# Patient Record
Sex: FEMALE | Race: WHITE | NOT HISPANIC OR LATINO | Employment: UNEMPLOYED | URBAN - METROPOLITAN AREA
[De-identification: names, ages, dates, MRNs, and addresses within clinical notes are randomized per-mention and may not be internally consistent; named-entity substitution may affect disease eponyms.]

---

## 2023-04-09 ENCOUNTER — HOSPITAL ENCOUNTER (OUTPATIENT)
Dept: DATA CONVERSION | Facility: HOSPITAL | Age: 28
End: 2023-04-10
Attending: OBSTETRICS & GYNECOLOGY
Payer: COMMERCIAL

## 2023-04-09 LAB
ALANINE AMINOTRANSFERASE (SGPT) (U/L) IN SER/PLAS: 20 U/L (ref 7–45)
ALBUMIN (G/DL) IN SER/PLAS: 3.6 G/DL (ref 3.4–5)
ALKALINE PHOSPHATASE (U/L) IN SER/PLAS: 70 U/L (ref 33–110)
ANION GAP IN SER/PLAS: 15 MMOL/L (ref 10–20)
ASPARTATE AMINOTRANSFERASE (SGOT) (U/L) IN SER/PLAS: 19 U/L (ref 9–39)
BILIRUBIN TOTAL (MG/DL) IN SER/PLAS: 0.3 MG/DL (ref 0–1.2)
CALCIUM (MG/DL) IN SER/PLAS: 10.1 MG/DL (ref 8.6–10.3)
CARBON DIOXIDE, TOTAL (MMOL/L) IN SER/PLAS: 25 MMOL/L (ref 21–32)
CHLORIDE (MMOL/L) IN SER/PLAS: 104 MMOL/L (ref 98–107)
CREATININE (MG/DL) IN SER/PLAS: 0.76 MG/DL (ref 0.5–1.05)
ERYTHROCYTE DISTRIBUTION WIDTH (RATIO) BY AUTOMATED COUNT: 13.4 % (ref 11.5–14.5)
ERYTHROCYTE MEAN CORPUSCULAR HEMOGLOBIN CONCENTRATION (G/DL) BY AUTOMATED: 32.3 G/DL (ref 32–36)
ERYTHROCYTE MEAN CORPUSCULAR VOLUME (FL) BY AUTOMATED COUNT: 87 FL (ref 80–100)
ERYTHROCYTES (10*6/UL) IN BLOOD BY AUTOMATED COUNT: 4.55 X10E12/L (ref 4–5.2)
GFR FEMALE: >90 ML/MIN/1.73M2
GLUCOSE (MG/DL) IN SER/PLAS: 91 MG/DL (ref 74–99)
HEMATOCRIT (%) IN BLOOD BY AUTOMATED COUNT: 39.6 % (ref 36–46)
HEMOGLOBIN (G/DL) IN BLOOD: 12.8 G/DL (ref 12–16)
LACTATE DEHYDROGENASE (U/L) IN SER/PLAS BY LAC->PYR RXN: 180 U/L (ref 84–246)
LEUKOCYTES (10*3/UL) IN BLOOD BY AUTOMATED COUNT: 14.9 X10E9/L (ref 4.4–11.3)
PLATELETS (10*3/UL) IN BLOOD AUTOMATED COUNT: 282 X10E9/L (ref 150–450)
POTASSIUM (MMOL/L) IN SER/PLAS: 3.9 MMOL/L (ref 3.5–5.3)
PROTEIN TOTAL: 6.7 G/DL (ref 6.4–8.2)
SODIUM (MMOL/L) IN SER/PLAS: 140 MMOL/L (ref 136–145)
URATE (MG/DL) IN SER/PLAS: 8.6 MG/DL (ref 2.3–6.7)
UREA NITROGEN (MG/DL) IN SER/PLAS: 11 MG/DL (ref 6–23)

## 2023-09-06 VITALS — BODY MASS INDEX: 51.4 KG/M2 | HEIGHT: 62 IN | WEIGHT: 279.32 LBS

## 2023-09-14 NOTE — DISCHARGE SUMMARY
Send Summary:   Discharge Summary Providers:  Provider Role Provider Name   · Attending Farnaz Francis   · Referring Farnaz Francis   · Primary Denver Harrison       Note Recipients: Denver Harrison DO       Discharge:    Summary:   Admission Date: .09-Apr-2023 20:44:00   Discharge Date: 09-Apr-2023   Attending Physician at Discharge: Nam   Admission Reason: Severe BP elevations   Final Discharge Diagnoses: CHTN   Procedures: none   Condition at Discharge: Good   Disposition at Discharge: Home   Vital Signs:        T   P  R  BP   MAP  SpO2   Value  36.8  80  16  138/72   99  98%  Date/Time 4/9 22:34 4/9 23:56 4/9 22:34 4/9 23:56  4/9 23:56 4/9 23:55  Range  (36.8C - 36.8C )  (65 - 84 )  (16 - 16 )  (120 - 138 )/ (64 - 74 )  (86 - 99 )  (93% - 99% )    Date:            Weight/Scale Type:  Height:   03-Apr-2023 18:11  133.3  kg / standing  157.4  cm  Hospital Course:    Patient presented postpartum for severe range BP at home. Her BP was normal range over an hour of monitoring and her labs were normal except an elevated uric acid  of 8.1. She was discharged home with precautions and plan for follow up in the office in the next 2 days.    Immunizations:    Immunizations:  05-Apr-2023   Tdap: Immunizations, 05-Apr-2023      Discharge Information:    and Continuing Care:   Lab Results - Pending:    None  Radiology Results - Pending: None   [facility] Suicide Risk: negative   Discharge Instructions:    Activity:           Return to normal activity as tolerated    Nutrition/Diet:           Regular    Follow Up Appointments:    Follow-Up - OB Provider:           Physician/Dept/Service:   OB Provider   Dr. Browning          Call to Schedule in:   1 week          Location:   [place] [facility]          Phone Number:   [phone]    Discharge Medications: Ibuprofen/Tylenol     DNR Status:   ·  Code Status Code Status order at time of discharge: Full Code       Electronic Signatures:  Farnaz Francis ()  (Signed  09-Apr-2023 23:58)   Authored: Send Summary, Summary Content, Immunizations,  Ongoing Care, DNR Status, Note Completion      Last Updated: 09-Apr-2023 23:58 by Farnaz Francis (DO)

## 2024-02-21 ENCOUNTER — LAB (OUTPATIENT)
Dept: LAB | Facility: LAB | Age: 29
End: 2024-02-21
Payer: COMMERCIAL

## 2024-02-21 ENCOUNTER — OFFICE VISIT (OUTPATIENT)
Dept: OBSTETRICS AND GYNECOLOGY | Facility: CLINIC | Age: 29
End: 2024-02-21
Payer: COMMERCIAL

## 2024-02-21 VITALS
BODY MASS INDEX: 45.36 KG/M2 | SYSTOLIC BLOOD PRESSURE: 122 MMHG | WEIGHT: 256 LBS | DIASTOLIC BLOOD PRESSURE: 74 MMHG | HEIGHT: 63 IN

## 2024-02-21 DIAGNOSIS — O99.210 OBESITY IN PREGNANCY (HHS-HCC): ICD-10-CM

## 2024-02-21 DIAGNOSIS — O10.919 PRE-EXISTING HYPERTENSION DURING PREGNANCY, ANTEPARTUM, UNSPECIFIED PRE-EXISTING HYPERTENSION TYPE (HHS-HCC): ICD-10-CM

## 2024-02-21 DIAGNOSIS — Z32.00 ENCOUNTER FOR CONFIRMATION OF PREGNANCY TEST RESULT WITH PHYSICAL EXAMINATION: ICD-10-CM

## 2024-02-21 DIAGNOSIS — Z32.00 ENCOUNTER FOR CONFIRMATION OF PREGNANCY TEST RESULT WITH PHYSICAL EXAMINATION: Primary | ICD-10-CM

## 2024-02-21 DIAGNOSIS — Z32.01 PREGNANCY TEST POSITIVE (HHS-HCC): ICD-10-CM

## 2024-02-21 PROBLEM — D49.6 BRAIN TUMOR (MULTI): Status: ACTIVE | Noted: 2024-02-21

## 2024-02-21 LAB
ABO GROUP (TYPE) IN BLOOD: NORMAL
ALBUMIN SERPL BCP-MCNC: 3.8 G/DL (ref 3.4–5)
ALP SERPL-CCNC: 41 U/L (ref 33–110)
ALT SERPL W P-5'-P-CCNC: 11 U/L (ref 7–45)
ANION GAP SERPL CALC-SCNC: 14 MMOL/L (ref 10–20)
ANTIBODY SCREEN: NORMAL
AST SERPL W P-5'-P-CCNC: 13 U/L (ref 9–39)
BILIRUB SERPL-MCNC: 0.2 MG/DL (ref 0–1.2)
BUN SERPL-MCNC: 8 MG/DL (ref 6–23)
CALCIUM SERPL-MCNC: 8.7 MG/DL (ref 8.6–10.3)
CHLORIDE SERPL-SCNC: 105 MMOL/L (ref 98–107)
CO2 SERPL-SCNC: 21 MMOL/L (ref 21–32)
CREAT SERPL-MCNC: 0.46 MG/DL (ref 0.5–1.05)
CRL: 94 MM
EGFRCR SERPLBLD CKD-EPI 2021: >90 ML/MIN/1.73M*2
ERYTHROCYTE [DISTWIDTH] IN BLOOD BY AUTOMATED COUNT: 12.2 % (ref 11.5–14.5)
EST. AVERAGE GLUCOSE BLD GHB EST-MCNC: 97 MG/DL
GLUCOSE SERPL-MCNC: 101 MG/DL (ref 74–99)
HBA1C MFR BLD: 5 %
HBV SURFACE AG SERPL QL IA: NONREACTIVE
HCT VFR BLD AUTO: 40.2 % (ref 36–46)
HCV AB SER QL: NONREACTIVE
HGB BLD-MCNC: 13.3 G/DL (ref 12–16)
HIV 1+2 AB+HIV1 P24 AG SERPL QL IA: NONREACTIVE
LDH SERPL L TO P-CCNC: 144 U/L (ref 84–246)
MCH RBC QN AUTO: 29.1 PG (ref 26–34)
MCHC RBC AUTO-ENTMCNC: 33.1 G/DL (ref 32–36)
MCV RBC AUTO: 88 FL (ref 80–100)
NRBC BLD-RTO: 0 /100 WBCS (ref 0–0)
PLATELET # BLD AUTO: 277 X10*3/UL (ref 150–450)
POTASSIUM SERPL-SCNC: 4 MMOL/L (ref 3.5–5.3)
PREGNANCY TEST URINE, POC: POSITIVE
PROT SERPL-MCNC: 6.5 G/DL (ref 6.4–8.2)
RBC # BLD AUTO: 4.57 X10*6/UL (ref 4–5.2)
RH FACTOR (ANTIGEN D): NORMAL
SODIUM SERPL-SCNC: 136 MMOL/L (ref 136–145)
TREPONEMA PALLIDUM IGG+IGM AB [PRESENCE] IN SERUM OR PLASMA BY IMMUNOASSAY: NONREACTIVE
URATE SERPL-MCNC: 4 MG/DL (ref 2.3–6.7)
WBC # BLD AUTO: 10.5 X10*3/UL (ref 4.4–11.3)

## 2024-02-21 PROCEDURE — 76817 TRANSVAGINAL US OBSTETRIC: CPT | Performed by: NURSE PRACTITIONER

## 2024-02-21 PROCEDURE — 81025 URINE PREGNANCY TEST: CPT | Performed by: NURSE PRACTITIONER

## 2024-02-21 PROCEDURE — 86900 BLOOD TYPING SEROLOGIC ABO: CPT

## 2024-02-21 PROCEDURE — 80053 COMPREHEN METABOLIC PANEL: CPT

## 2024-02-21 PROCEDURE — 3078F DIAST BP <80 MM HG: CPT | Performed by: NURSE PRACTITIONER

## 2024-02-21 PROCEDURE — 86317 IMMUNOASSAY INFECTIOUS AGENT: CPT

## 2024-02-21 PROCEDURE — 86780 TREPONEMA PALLIDUM: CPT

## 2024-02-21 PROCEDURE — 84156 ASSAY OF PROTEIN URINE: CPT

## 2024-02-21 PROCEDURE — 85027 COMPLETE CBC AUTOMATED: CPT

## 2024-02-21 PROCEDURE — 83036 HEMOGLOBIN GLYCOSYLATED A1C: CPT

## 2024-02-21 PROCEDURE — 3074F SYST BP LT 130 MM HG: CPT | Performed by: NURSE PRACTITIONER

## 2024-02-21 PROCEDURE — 87389 HIV-1 AG W/HIV-1&-2 AB AG IA: CPT

## 2024-02-21 PROCEDURE — 86901 BLOOD TYPING SEROLOGIC RH(D): CPT

## 2024-02-21 PROCEDURE — 86803 HEPATITIS C AB TEST: CPT

## 2024-02-21 PROCEDURE — 84550 ASSAY OF BLOOD/URIC ACID: CPT

## 2024-02-21 PROCEDURE — 86850 RBC ANTIBODY SCREEN: CPT

## 2024-02-21 PROCEDURE — 36415 COLL VENOUS BLD VENIPUNCTURE: CPT

## 2024-02-21 PROCEDURE — 82570 ASSAY OF URINE CREATININE: CPT

## 2024-02-21 PROCEDURE — 87340 HEPATITIS B SURFACE AG IA: CPT

## 2024-02-21 PROCEDURE — 99214 OFFICE O/P EST MOD 30 MIN: CPT | Performed by: NURSE PRACTITIONER

## 2024-02-21 PROCEDURE — 83615 LACTATE (LD) (LDH) ENZYME: CPT

## 2024-02-21 PROCEDURE — 1036F TOBACCO NON-USER: CPT | Performed by: NURSE PRACTITIONER

## 2024-02-21 ASSESSMENT — ENCOUNTER SYMPTOMS
CARDIOVASCULAR NEGATIVE: 1
PSYCHIATRIC NEGATIVE: 1
EYES NEGATIVE: 1
NEUROLOGICAL NEGATIVE: 1
ALLERGIC/IMMUNOLOGIC NEGATIVE: 1
ENDOCRINE NEGATIVE: 1
GASTROINTESTINAL NEGATIVE: 1
HEMATOLOGIC/LYMPHATIC NEGATIVE: 1
RESPIRATORY NEGATIVE: 1
FATIGUE: 1
MUSCULOSKELETAL NEGATIVE: 1

## 2024-02-21 NOTE — PROGRESS NOTES
"Chief Complaint    PREGNANCY CONFIRMATION        HPI    LMP 23    NATACHA 8/10/24  15.4 WEEKS  Last edited by Nikole Lira MA on 2024  9:51 AM.         Monse White is a 28 y.o.  female who presents for a pregnancy confirmation.     Patient's last menstrual period was 2023 (exact date).  She is 15.4 weeks gestation with NATACHA 08/10/2024.  Patient's menstrual cycles are regular, every 30 days, lasting 5 days.  She had +home pregnancy test in December.   Denies vaginal bleeding since her LMP.  Reports symptoms of fatigue.   She is a non-smoker.    Her pregnancy is complicated by:  Obesity: BMI 45.35  H/O CHTN    PMH  H/O Brain tumor    /74   Ht 1.6 m (5' 3\")   Wt 116 kg (256 lb)   LMP 2023 (Exact Date)   BMI 45.35 kg/m²      Review of Systems   Constitutional:  Positive for fatigue.   HENT: Negative.     Eyes: Negative.    Respiratory: Negative.     Cardiovascular: Negative.    Gastrointestinal: Negative.    Endocrine: Negative.    Genitourinary: Negative.    Musculoskeletal: Negative.    Skin: Negative.    Allergic/Immunologic: Negative.    Neurological: Negative.    Hematological: Negative.    Psychiatric/Behavioral: Negative.     All other systems reviewed and are negative.       Physical Exam  Constitutional:       Appearance: Normal appearance.   HENT:      Head: Normocephalic.      Nose: Nose normal.   Pulmonary:      Effort: Pulmonary effort is normal.   Musculoskeletal:         General: Normal range of motion.      Cervical back: Normal range of motion and neck supple.   Skin:     General: Skin is warm and dry.   Neurological:      Mental Status: She is alert.   Psychiatric:         Mood and Affect: Mood normal.         Behavior: Behavior normal.        BSUS: Limited d/t habitus. Viable IUP with +FM and +FCA measuring ~15.1 weeks which is c/w LMP    Assessment/Plan   Diagnoses and all orders for this visit:  Encounter for confirmation of pregnancy test result with " physical examination  -Seen today for confirmation of pregnancy.  -     US OB transvaginal  -Pregnancy confirmed with +urine pregnancy test and trans-abdominal US performed today.  -Viable pregnancy in second trimester w/ +fetal heartbeat and US confirming gestational age that is c/w LMP.  -NATACHA 08/10/2024.  -Orders for prenatal lab work today.  -     CBC Anemia Panel With Reflex,Pregnancy; Future  -     Hepatitis B Surface Antigen; Future  -     Hepatitis C Antibody; Future  -     HIV 1/2 Antigen/Antibody Screen with Reflex to Confirmation; Future  -     Rubella Antibody, IgG; Future  -     Syphilis Screen with Reflex; Future  -     Type And Screen; Future  -Genetic screening options reviewed, patient declines at this time.   -Follow up for new OB 2 weeks.   Pregnancy test positive  -     POCT pregnancy, urine manually resulted  Obesity in pregnancy  -Early diabetes testing indicated:  -     Hemoglobin A1C; Future  -     US MAC OB imaging order; Future  Pre-existing hypertension during pregnancy, antepartum, unspecified pre-existing hypertension type  -Baseline PEC labs, UPCr:  -     Comprehensive Metabolic Panel; Future  -     Lactate Dehydrogenase; Future  -     Protein, Urine Random  -     Uric Acid; Future  -     US MAC OB imaging order; Future   -Advised to start aspirin prophylaxis 162 mg daily.

## 2024-02-22 LAB
CREAT UR-MCNC: 183.5 MG/DL (ref 20–320)
PROT UR-ACNC: 14 MG/DL (ref 5–24)
PROT/CREAT UR: 0.08 MG/MG CREAT (ref 0–0.17)
REFLEX ADDED, ANEMIA PANEL: NORMAL
RUBV IGG SERPL IA-ACNC: 1.5 IA
RUBV IGG SERPL QL IA: POSITIVE

## 2024-03-05 ENCOUNTER — ANCILLARY ORDERS (OUTPATIENT)
Dept: OBSTETRICS AND GYNECOLOGY | Facility: CLINIC | Age: 29
End: 2024-03-05

## 2024-03-05 ENCOUNTER — INITIAL PRENATAL (OUTPATIENT)
Dept: OBSTETRICS AND GYNECOLOGY | Facility: CLINIC | Age: 29
End: 2024-03-05
Payer: COMMERCIAL

## 2024-03-05 VITALS — DIASTOLIC BLOOD PRESSURE: 80 MMHG | BODY MASS INDEX: 46.23 KG/M2 | SYSTOLIC BLOOD PRESSURE: 116 MMHG | WEIGHT: 261 LBS

## 2024-03-05 DIAGNOSIS — O09.32 LATE PRENATAL CARE AFFECTING PREGNANCY IN SECOND TRIMESTER (HHS-HCC): ICD-10-CM

## 2024-03-05 DIAGNOSIS — O99.210 OBESITY IN PREGNANCY (HHS-HCC): ICD-10-CM

## 2024-03-05 DIAGNOSIS — O10.919 PRE-EXISTING HYPERTENSION DURING PREGNANCY, ANTEPARTUM, UNSPECIFIED PRE-EXISTING HYPERTENSION TYPE (HHS-HCC): ICD-10-CM

## 2024-03-05 DIAGNOSIS — O99.210 OBESITY IN PREGNANCY (HHS-HCC): Primary | ICD-10-CM

## 2024-03-05 DIAGNOSIS — Z3A.17 17 WEEKS GESTATION OF PREGNANCY (HHS-HCC): ICD-10-CM

## 2024-03-05 DIAGNOSIS — Z34.02 ENCOUNTER FOR SUPERVISION OF NORMAL FIRST PREGNANCY IN SECOND TRIMESTER (HHS-HCC): Primary | ICD-10-CM

## 2024-03-05 LAB
POC APPEARANCE, URINE: CLEAR
POC BILIRUBIN, URINE: NEGATIVE
POC BLOOD, URINE: NEGATIVE
POC COLOR, URINE: YELLOW
POC GLUCOSE, URINE: NEGATIVE MG/DL
POC KETONES, URINE: NEGATIVE MG/DL
POC LEUKOCYTES, URINE: NEGATIVE
POC NITRITE,URINE: NEGATIVE
POC PH, URINE: 5 PH
POC PROTEIN, URINE: NEGATIVE MG/DL
POC SPECIFIC GRAVITY, URINE: 1.01
POC UROBILINOGEN, URINE: 0.2 EU/DL

## 2024-03-05 PROCEDURE — 90471 IMMUNIZATION ADMIN: CPT | Performed by: NURSE PRACTITIONER

## 2024-03-05 PROCEDURE — 87086 URINE CULTURE/COLONY COUNT: CPT

## 2024-03-05 PROCEDURE — 90686 IIV4 VACC NO PRSV 0.5 ML IM: CPT | Performed by: NURSE PRACTITIONER

## 2024-03-05 PROCEDURE — 87800 DETECT AGNT MULT DNA DIREC: CPT

## 2024-03-05 PROCEDURE — 0500F INITIAL PRENATAL CARE VISIT: CPT | Performed by: NURSE PRACTITIONER

## 2024-03-05 NOTE — PROGRESS NOTES
Monse White 28 y.o.  at 17w3d with a working estimated date of delivery of Estimated Date of Delivery: 8/10/24 who presents for initial prenatal visit.   She is feeling well overall.   Counseled and advised flu vaccine, given today.    Her pregnancy is complicated by:  Obesity: Pregravid BMI 45. Normal HgbA1C. Plan serial growth ultrasounds and weekly NSTs starting at 34 weeks.  CHTN: Normal baseline PEC labs, UPCr. Taking ASA prophylaxis as advised.    Pap 2021 Negative   NG/CT, Ucx collected today  Prenatal labs normal  Genetic screening options reviewed, patient declines  Anatomy ultrasound ordered.     Objective   Fetal Heart Rate: +US  BP: 116/80  Urine Dipstick  Urine Protein: NEGATIVE  Urine Leukocytes: NEGATIVE  Urine Ketone: NEGATIVE  Urine Glucose: NEGATIVE  Weight  Weight: 118 kg (261 lb)     BSUS: Limited d/t habitus. +FCA and +FM, grossly normal fluid      Assessment/Plan   Diagnoses and all orders for this visit:  Encounter for supervision of normal first pregnancy in second trimester  -     C. Trachomatis / N. Gonorrhoeae, Amplified Detection  -     Urine culture  17 weeks gestation of pregnancy  -     POCT UA (nonautomated) manually resulted  Obesity in pregnancy  Pre-existing hypertension during pregnancy, antepartum, unspecified pre-existing hypertension type  Late prenatal care affecting pregnancy in second trimester  -     US MAC OB imaging order; Future   Patient oriented to practice including shared OB call and how to reach physician on call  NG/CT and urine cultures collected  Declines genetics  Anatomy US ordered  Flu vaccine given today  Precautions given  Follow up routine prenatal visit in 4 weeks or sooner if needed.

## 2024-03-06 LAB
BACTERIA UR CULT: NORMAL
C TRACH RRNA SPEC QL NAA+PROBE: NEGATIVE
N GONORRHOEA DNA SPEC QL PROBE+SIG AMP: NEGATIVE

## 2024-03-26 ENCOUNTER — HOSPITAL ENCOUNTER (OUTPATIENT)
Dept: RADIOLOGY | Facility: CLINIC | Age: 29
Discharge: HOME | End: 2024-03-26
Payer: COMMERCIAL

## 2024-03-26 DIAGNOSIS — O09.32 LATE PRENATAL CARE AFFECTING PREGNANCY IN SECOND TRIMESTER (HHS-HCC): ICD-10-CM

## 2024-03-26 DIAGNOSIS — O10.919 PRE-EXISTING HYPERTENSION DURING PREGNANCY, ANTEPARTUM, UNSPECIFIED PRE-EXISTING HYPERTENSION TYPE (HHS-HCC): ICD-10-CM

## 2024-03-26 DIAGNOSIS — O99.210 OBESITY IN PREGNANCY (HHS-HCC): ICD-10-CM

## 2024-03-26 PROCEDURE — 76811 OB US DETAILED SNGL FETUS: CPT | Performed by: OBSTETRICS & GYNECOLOGY

## 2024-03-26 PROCEDURE — 76811 OB US DETAILED SNGL FETUS: CPT

## 2024-03-29 NOTE — PROGRESS NOTES
Subjective   Patient ID [id number]   Monse White is a 28 y.o.  at 21w3d     Her pregnancy is complicated by:  Obesity: Pregravid BMI 45. Normal HgbA1C. Plan serial growth ultrasounds and weekly NSTs starting at 34 weeks.  CHTN: Normal baseline PEC labs, UPCr. Taking ASA prophylaxis as advised.    Objective   Physical Exam               Lab Results   Component Value Date    HGB 13.3 2024    HCT 40.2 2024       Assessment/Plan     Follow up in 4 weeks for a routine prenatal visit.

## 2024-04-02 ENCOUNTER — APPOINTMENT (OUTPATIENT)
Dept: OBSTETRICS AND GYNECOLOGY | Facility: CLINIC | Age: 29
End: 2024-04-02
Payer: COMMERCIAL

## 2024-04-18 ENCOUNTER — ROUTINE PRENATAL (OUTPATIENT)
Dept: OBSTETRICS AND GYNECOLOGY | Facility: CLINIC | Age: 29
End: 2024-04-18
Payer: COMMERCIAL

## 2024-04-18 VITALS — WEIGHT: 271 LBS | BODY MASS INDEX: 48.01 KG/M2 | SYSTOLIC BLOOD PRESSURE: 120 MMHG | DIASTOLIC BLOOD PRESSURE: 78 MMHG

## 2024-04-18 DIAGNOSIS — Z34.82 PRENATAL CARE, SUBSEQUENT PREGNANCY, SECOND TRIMESTER (HHS-HCC): Primary | ICD-10-CM

## 2024-04-18 DIAGNOSIS — O99.210 OBESITY IN PREGNANCY (HHS-HCC): ICD-10-CM

## 2024-04-18 DIAGNOSIS — Z3A.23 23 WEEKS GESTATION OF PREGNANCY (HHS-HCC): ICD-10-CM

## 2024-04-18 DIAGNOSIS — O10.019 PRE-EXISTING ESSENTIAL HYPERTENSION DURING PREGNANCY, ANTEPARTUM (HHS-HCC): ICD-10-CM

## 2024-04-18 LAB
POC GLUCOSE, URINE: NEGATIVE MG/DL
POC KETONES, URINE: ABNORMAL MG/DL
POC PROTEIN, URINE: ABNORMAL MG/DL

## 2024-04-18 PROCEDURE — 0501F PRENATAL FLOW SHEET: CPT | Performed by: OBSTETRICS & GYNECOLOGY

## 2024-04-18 NOTE — PROGRESS NOTES
Prenatal Visit    Patient presents for routine prenatal visit.   Feeling well.  Baby is moving. No abdominal pain. No bleeding. No leaking fluid.    Objective   Physical Exam:   Weight: 123 kg (271 lb)  Expected Total Weight Gain: 5 kg (11 lb)-9 kg (19 lb)   Pregravid BMI: 45.36  BP: 120/78  Fetal Heart Rate: 155 Fundal Height (cm): 27 cm             Assessment/Plan   1. Prenatal care, subsequent pregnancy, second trimester ([facility])  2. 23 weeks gestation of pregnancy ([facility])  - POCT UA Automated manually resulted  - CBC Anemia Panel With Reflex,Pregnancy; Future  - Glucose, 1 Hour Screen, Pregnancy; Future  3. Obesity in pregnancy ([facility])  4. Pre-existing essential hypertension during pregnancy, antepartum ([facility])    Doing well today.  Orders placed for glucose screening, second trimester CBC.  Blood pressures well controlled.  Continue prenatal vitamins  Precautions given. Advised her to call for any concerns.  Follow up in 4 weeks.

## 2024-05-21 ENCOUNTER — APPOINTMENT (OUTPATIENT)
Dept: OBSTETRICS AND GYNECOLOGY | Facility: CLINIC | Age: 29
End: 2024-05-21
Payer: COMMERCIAL

## 2024-05-21 DIAGNOSIS — Z34.80 SUPERVISION OF OTHER NORMAL PREGNANCY, ANTEPARTUM (HHS-HCC): ICD-10-CM

## 2024-05-21 NOTE — PROGRESS NOTES
Monse White is a 28 y.o.  at 28w3d with a working estimated date of delivery of 8/10/24 by last menstrual period presents today for her routine prenatal visit.  +FM, no LOF, VB  ***      Scribe Attestation  By signing my name below, IColeen, Scribe   attest that this documentation has been prepared under the direction and in the presence of Jose Enriquez MD.

## 2024-05-23 ENCOUNTER — ROUTINE PRENATAL (OUTPATIENT)
Dept: OBSTETRICS AND GYNECOLOGY | Facility: CLINIC | Age: 29
End: 2024-05-23
Payer: COMMERCIAL

## 2024-05-23 ENCOUNTER — LAB (OUTPATIENT)
Dept: LAB | Facility: LAB | Age: 29
End: 2024-05-23
Payer: COMMERCIAL

## 2024-05-23 VITALS — BODY MASS INDEX: 49.42 KG/M2 | SYSTOLIC BLOOD PRESSURE: 122 MMHG | DIASTOLIC BLOOD PRESSURE: 70 MMHG | WEIGHT: 279 LBS

## 2024-05-23 DIAGNOSIS — Z34.80 SUPERVISION OF OTHER NORMAL PREGNANCY, ANTEPARTUM (HHS-HCC): Primary | ICD-10-CM

## 2024-05-23 DIAGNOSIS — O10.019 PRE-EXISTING ESSENTIAL HYPERTENSION DURING PREGNANCY, ANTEPARTUM (HHS-HCC): ICD-10-CM

## 2024-05-23 DIAGNOSIS — O99.210 OBESITY IN PREGNANCY (HHS-HCC): ICD-10-CM

## 2024-05-23 DIAGNOSIS — Z3A.23 23 WEEKS GESTATION OF PREGNANCY (HHS-HCC): ICD-10-CM

## 2024-05-23 LAB
ERYTHROCYTE [DISTWIDTH] IN BLOOD BY AUTOMATED COUNT: 13.4 % (ref 11.5–14.5)
GLUCOSE 1H P 50 G GLC PO SERPL-MCNC: 110 MG/DL
HCT VFR BLD AUTO: 36.7 % (ref 36–46)
HGB BLD-MCNC: 11.5 G/DL (ref 12–16)
MCH RBC QN AUTO: 28.6 PG (ref 26–34)
MCHC RBC AUTO-ENTMCNC: 31.3 G/DL (ref 32–36)
MCV RBC AUTO: 91 FL (ref 80–100)
NRBC BLD-RTO: 0 /100 WBCS (ref 0–0)
PLATELET # BLD AUTO: 236 X10*3/UL (ref 150–450)
POC APPEARANCE, URINE: CLEAR
POC BILIRUBIN, URINE: NEGATIVE
POC BLOOD, URINE: NEGATIVE
POC COLOR, URINE: YELLOW
POC GLUCOSE, URINE: NEGATIVE MG/DL
POC KETONES, URINE: NEGATIVE MG/DL
POC LEUKOCYTES, URINE: NEGATIVE
POC NITRITE,URINE: NEGATIVE
POC PH, URINE: 7 PH
POC PROTEIN, URINE: NEGATIVE MG/DL
POC SPECIFIC GRAVITY, URINE: 1.01
POC UROBILINOGEN, URINE: 0.2 EU/DL
RBC # BLD AUTO: 4.02 X10*6/UL (ref 4–5.2)
WBC # BLD AUTO: 10.2 X10*3/UL (ref 4.4–11.3)

## 2024-05-23 PROCEDURE — 36415 COLL VENOUS BLD VENIPUNCTURE: CPT

## 2024-05-23 PROCEDURE — 85027 COMPLETE CBC AUTOMATED: CPT

## 2024-05-23 PROCEDURE — 0501F PRENATAL FLOW SHEET: CPT | Performed by: OBSTETRICS & GYNECOLOGY

## 2024-05-23 PROCEDURE — 82947 ASSAY GLUCOSE BLOOD QUANT: CPT

## 2024-05-23 NOTE — PROGRESS NOTES
Prenatal Visit    Patient presents for routine prenatal visit.   Feeling ok.  Thinks she might have a hernia, has a bump in her belly button. Just sees it when she is sitting, goes away when lying down. No pain.  Baby is moving. No abdominal pain. No bleeding. No leaking fluid.    Objective   Physical Exam:   Weight: 127 kg (279 lb)  Expected Total Weight Gain: 5 kg (11 lb)-9 kg (19 lb)   Pregravid BMI: 45.36  BP: 122/70  Fetal Heart Rate: 145 Fundal Height (cm): 34 cm             Assessment/Plan   1. Supervision of other normal pregnancy, antepartum ([facility])  - POCT UA (nonautomated) manually resulted  2. Pre-existing essential hypertension during pregnancy, antepartum ([facility])  3. Obesity in pregnancy ([facility])    Doing well today.  Blood pressure continues to be well controlled.  She is doing her glucose screening today.  Has a growth ultrasound in 2 weeks.  Precautions given. Advised her to call for any concerns.  Follow up in 2-3 weeks.

## 2024-05-24 LAB — REFLEX ADDED, ANEMIA PANEL: NORMAL

## 2024-06-04 ENCOUNTER — HOSPITAL ENCOUNTER (OUTPATIENT)
Dept: RADIOLOGY | Facility: CLINIC | Age: 29
Discharge: HOME | End: 2024-06-04
Payer: COMMERCIAL

## 2024-06-04 DIAGNOSIS — O99.210 OBESITY IN PREGNANCY (HHS-HCC): ICD-10-CM

## 2024-06-04 DIAGNOSIS — O10.919 PRE-EXISTING HYPERTENSION DURING PREGNANCY, ANTEPARTUM, UNSPECIFIED PRE-EXISTING HYPERTENSION TYPE (HHS-HCC): ICD-10-CM

## 2024-06-04 DIAGNOSIS — O09.32 LATE PRENATAL CARE AFFECTING PREGNANCY IN SECOND TRIMESTER (HHS-HCC): ICD-10-CM

## 2024-06-04 PROCEDURE — 76816 OB US FOLLOW-UP PER FETUS: CPT | Performed by: STUDENT IN AN ORGANIZED HEALTH CARE EDUCATION/TRAINING PROGRAM

## 2024-06-04 PROCEDURE — 76816 OB US FOLLOW-UP PER FETUS: CPT

## 2024-06-05 ENCOUNTER — DOCUMENTATION (OUTPATIENT)
Dept: OBSTETRICS AND GYNECOLOGY | Facility: CLINIC | Age: 29
End: 2024-06-05
Payer: COMMERCIAL

## 2024-06-05 NOTE — PROGRESS NOTES
Chief Complaint    Results        HPI       Results     Additional comments: Growth US reviewed          Last edited by Kelsea Zacarias, LILLIANA-CNP on 6/5/2024  9:28 AM.         Normal interval growth

## 2024-06-07 ENCOUNTER — ROUTINE PRENATAL (OUTPATIENT)
Dept: OBSTETRICS AND GYNECOLOGY | Facility: CLINIC | Age: 29
End: 2024-06-07
Payer: COMMERCIAL

## 2024-06-07 VITALS — BODY MASS INDEX: 49.6 KG/M2 | WEIGHT: 280 LBS | DIASTOLIC BLOOD PRESSURE: 66 MMHG | SYSTOLIC BLOOD PRESSURE: 108 MMHG

## 2024-06-07 DIAGNOSIS — O99.210 OBESITY IN PREGNANCY (HHS-HCC): ICD-10-CM

## 2024-06-07 DIAGNOSIS — Z23 NEED FOR TDAP VACCINATION: ICD-10-CM

## 2024-06-07 DIAGNOSIS — Z34.80 SUPERVISION OF OTHER NORMAL PREGNANCY, ANTEPARTUM (HHS-HCC): Primary | ICD-10-CM

## 2024-06-07 LAB
POC APPEARANCE, URINE: CLEAR
POC BILIRUBIN, URINE: NEGATIVE
POC BLOOD, URINE: NEGATIVE
POC COLOR, URINE: YELLOW
POC GLUCOSE, URINE: NEGATIVE MG/DL
POC KETONES, URINE: NEGATIVE MG/DL
POC LEUKOCYTES, URINE: NEGATIVE
POC NITRITE,URINE: NEGATIVE
POC PH, URINE: 5 PH
POC PROTEIN, URINE: NEGATIVE MG/DL
POC SPECIFIC GRAVITY, URINE: 1.02
POC UROBILINOGEN, URINE: 0.2 EU/DL

## 2024-06-07 PROCEDURE — 90471 IMMUNIZATION ADMIN: CPT | Performed by: OBSTETRICS & GYNECOLOGY

## 2024-06-07 PROCEDURE — 0501F PRENATAL FLOW SHEET: CPT | Performed by: OBSTETRICS & GYNECOLOGY

## 2024-06-07 PROCEDURE — 90715 TDAP VACCINE 7 YRS/> IM: CPT | Performed by: OBSTETRICS & GYNECOLOGY

## 2024-06-07 NOTE — PROGRESS NOTES
Subjective   Monse White is a 28 y.o.  at 30w6d, presents for a routine prenatal visit.   Feels well.  Recent growth ultrasound normal interval growth.  We discussed her BMI nearing 50, delivery CBC if 50.    Objective     /66   Wt 127 kg (280 lb)   LMP 2023 (Exact Date)   BMI 49.60 kg/m²     Lab Results   Component Value Date    HGB 11.5 (L) 2024    HCT 36.7 2024 normal one hour glucose 110  2024 ultrasound normal interval fetal growth EFW 39%ile. Known right ovarian cyst stable.     Plan  1. Supervision of other normal pregnancy, antepartum ([facility])  - POCT UA (nonautomated) manually resulted    2. Need for Tdap vaccination  - Tdap vaccine, age 7 years and older ([facility])    3. Obesity in pregnancy ([facility])  Advised regarding delivery [facility] if BMI 50  Start NST 34 weeks, weekly.  Next growth ultrasound 36 weeks scheduled.    RTO 2 weeks.    Tabatha Cardozo MD

## 2024-06-10 ENCOUNTER — INITIAL PRENATAL (OUTPATIENT)
Dept: OBSTETRICS AND GYNECOLOGY | Facility: CLINIC | Age: 29
End: 2024-06-10
Payer: COMMERCIAL

## 2024-06-10 VITALS — DIASTOLIC BLOOD PRESSURE: 78 MMHG | SYSTOLIC BLOOD PRESSURE: 120 MMHG | WEIGHT: 279 LBS | BODY MASS INDEX: 49.42 KG/M2

## 2024-06-10 DIAGNOSIS — O26.893: ICD-10-CM

## 2024-06-10 DIAGNOSIS — O36.8130 DECREASED FETAL MOVEMENTS IN THIRD TRIMESTER, SINGLE OR UNSPECIFIED FETUS (HHS-HCC): ICD-10-CM

## 2024-06-10 DIAGNOSIS — O23.43: ICD-10-CM

## 2024-06-10 DIAGNOSIS — R10.2: ICD-10-CM

## 2024-06-10 DIAGNOSIS — Z34.80 SUPERVISION OF OTHER NORMAL PREGNANCY, ANTEPARTUM (HHS-HCC): Primary | ICD-10-CM

## 2024-06-10 LAB
POC APPEARANCE, URINE: CLEAR
POC BILIRUBIN, URINE: NEGATIVE
POC BLOOD, URINE: ABNORMAL
POC COLOR, URINE: ABNORMAL
POC GLUCOSE, URINE: NEGATIVE MG/DL
POC KETONES, URINE: NEGATIVE MG/DL
POC LEUKOCYTES, URINE: NEGATIVE
POC NITRITE,URINE: NEGATIVE
POC PH, URINE: 5 PH
POC PROTEIN, URINE: ABNORMAL MG/DL
POC SPECIFIC GRAVITY, URINE: 1.02
POC UROBILINOGEN, URINE: 0.2 EU/DL

## 2024-06-10 PROCEDURE — 59025 FETAL NON-STRESS TEST: CPT | Performed by: OBSTETRICS & GYNECOLOGY

## 2024-06-10 PROCEDURE — 0501F PRENATAL FLOW SHEET: CPT | Performed by: OBSTETRICS & GYNECOLOGY

## 2024-06-10 PROCEDURE — 87086 URINE CULTURE/COLONY COUNT: CPT

## 2024-06-10 RX ORDER — NITROFURANTOIN 25; 75 MG/1; MG/1
100 CAPSULE ORAL 2 TIMES DAILY
Qty: 14 CAPSULE | Refills: 0 | Status: SHIPPED | OUTPATIENT
Start: 2024-06-10 | End: 2024-06-17

## 2024-06-10 NOTE — PROGRESS NOTES
Prenatal Visit    Patient presents for a problem visit.  She started feeling a lot of pressure on her bladder around 6 AM this morning. Felt like baby had dropped overnight. Also having to urinate constantly. Pain is in the center of lower abdomen, constant. Doesn't feel like contractions.  No leaking fluid. No vaginal bleeding.  No burning with urination.  Baby is moving, but it feels like not as much as usual.    Objective   Physical Exam:   Weight: 127 kg (279 lb)  Expected Total Weight Gain: 5 kg (11 lb)-9 kg (19 lb)   Pregravid BMI: 45.36  BP: 120/78  Fetal Heart Rate: 160      Dilation: Closed Effacement (%): 30 Fetal Station: Ballotable  Speculum exam: no pooling fluid in the vagina. Small amount of white discharge. No blood.    Non-Stress Test   Baseline Fetal Heart Rate for Non-Stress Test: 160 BPM  Variability in Waveform for Non-Stress Test: Moderate  Accelerations in Non-Stress Test: Yes, greater than/equal to 15 bpm, lasting at least 15 seconds  Decelerations in Non-Stress Test: None  Contractions in Non-Stress Test: Not present  Interpretation of Non-Stress Test   Interpretation of Non-Stress Test: Reactive  NST for Multiple Fetuses: No                          Assessment/Plan   1. Supervision of other normal pregnancy, antepartum ([facility])  - POCT UA (nonautomated) manually resulted  2. UTI (urinary tract infection) during pregnancy, third trimester ([facility])  - nitrofurantoin, macrocrystal-monohydrate, (Macrobid) 100 mg capsule; Take 1 capsule (100 mg) by mouth 2 times a day for 7 days.  Dispense: 14 capsule; Refill: 0  - Urine Culture  3. Decreased fetal movements in third trimester, single or unspecified fetus ([facility])  - Fetal nonstress test  4. Pelvic pressure in pregnancy, third trimester ([facility])  - Fetal nonstress test    NST is reactive. Patient reassured about baby's movements.  No signs of rupture of membranes or active labor.  Based on symptoms and urine sample in office, suspect she has  a UTI. Will send urine for culture and start her on an antibiotic.  Precautions given. Advised her to call for worsening symptoms or other changes.  Follow up in 1 weeks.

## 2024-06-12 ENCOUNTER — HOSPITAL ENCOUNTER (EMERGENCY)
Facility: HOSPITAL | Age: 29
End: 2024-06-12
Payer: COMMERCIAL

## 2024-06-12 ENCOUNTER — HOSPITAL ENCOUNTER (OUTPATIENT)
Facility: HOSPITAL | Age: 29
Discharge: HOME | End: 2024-06-12
Attending: OBSTETRICS & GYNECOLOGY | Admitting: OBSTETRICS & GYNECOLOGY
Payer: COMMERCIAL

## 2024-06-12 ENCOUNTER — HOSPITAL ENCOUNTER (OUTPATIENT)
Facility: HOSPITAL | Age: 29
End: 2024-06-12
Attending: OBSTETRICS & GYNECOLOGY | Admitting: OBSTETRICS & GYNECOLOGY
Payer: COMMERCIAL

## 2024-06-12 VITALS
WEIGHT: 280.2 LBS | RESPIRATION RATE: 18 BRPM | DIASTOLIC BLOOD PRESSURE: 72 MMHG | HEIGHT: 64 IN | OXYGEN SATURATION: 99 % | SYSTOLIC BLOOD PRESSURE: 143 MMHG | HEART RATE: 87 BPM | BODY MASS INDEX: 47.84 KG/M2 | TEMPERATURE: 98.1 F

## 2024-06-12 DIAGNOSIS — O21.9 NAUSEA AND VOMITING DURING PREGNANCY (HHS-HCC): Primary | ICD-10-CM

## 2024-06-12 PROBLEM — R10.9 FLANK PAIN: Status: ACTIVE | Noted: 2024-06-12

## 2024-06-12 LAB
ALBUMIN SERPL BCP-MCNC: 3.5 G/DL (ref 3.4–5)
ALP SERPL-CCNC: 62 U/L (ref 33–110)
ALT SERPL W P-5'-P-CCNC: 8 U/L (ref 7–45)
ANION GAP SERPL CALC-SCNC: 14 MMOL/L (ref 10–20)
APPEARANCE UR: CLEAR
AST SERPL W P-5'-P-CCNC: 15 U/L (ref 9–39)
BACTERIA UR CULT: NORMAL
BASOPHILS # BLD AUTO: 0.03 X10*3/UL (ref 0–0.1)
BASOPHILS NFR BLD AUTO: 0.2 %
BILIRUB SERPL-MCNC: 0.4 MG/DL (ref 0–1.2)
BILIRUB UR STRIP.AUTO-MCNC: NEGATIVE MG/DL
BUN SERPL-MCNC: 6 MG/DL (ref 6–23)
CALCIUM SERPL-MCNC: 8.7 MG/DL (ref 8.6–10.3)
CHLORIDE SERPL-SCNC: 102 MMOL/L (ref 98–107)
CO2 SERPL-SCNC: 21 MMOL/L (ref 21–32)
COLOR UR: ABNORMAL
CREAT SERPL-MCNC: 0.76 MG/DL (ref 0.5–1.05)
EGFRCR SERPLBLD CKD-EPI 2021: >90 ML/MIN/1.73M*2
EOSINOPHIL # BLD AUTO: 0.07 X10*3/UL (ref 0–0.7)
EOSINOPHIL NFR BLD AUTO: 0.4 %
ERYTHROCYTE [DISTWIDTH] IN BLOOD BY AUTOMATED COUNT: 13.4 % (ref 11.5–14.5)
GLUCOSE SERPL-MCNC: 95 MG/DL (ref 74–99)
GLUCOSE UR STRIP.AUTO-MCNC: NORMAL MG/DL
HCT VFR BLD AUTO: 36 % (ref 36–46)
HGB BLD-MCNC: 11.9 G/DL (ref 12–16)
IMM GRANULOCYTES # BLD AUTO: 0.11 X10*3/UL (ref 0–0.7)
IMM GRANULOCYTES NFR BLD AUTO: 0.7 % (ref 0–0.9)
KETONES UR STRIP.AUTO-MCNC: ABNORMAL MG/DL
LEUKOCYTE ESTERASE UR QL STRIP.AUTO: NEGATIVE
LYMPHOCYTES # BLD AUTO: 1.76 X10*3/UL (ref 1.2–4.8)
LYMPHOCYTES NFR BLD AUTO: 10.9 %
MCH RBC QN AUTO: 28.7 PG (ref 26–34)
MCHC RBC AUTO-ENTMCNC: 33.1 G/DL (ref 32–36)
MCV RBC AUTO: 87 FL (ref 80–100)
MONOCYTES # BLD AUTO: 0.88 X10*3/UL (ref 0.1–1)
MONOCYTES NFR BLD AUTO: 5.4 %
NEUTROPHILS # BLD AUTO: 13.34 X10*3/UL (ref 1.2–7.7)
NEUTROPHILS NFR BLD AUTO: 82.4 %
NITRITE UR QL STRIP.AUTO: NEGATIVE
NRBC BLD-RTO: 0 /100 WBCS (ref 0–0)
PH UR STRIP.AUTO: 6 [PH]
PLATELET # BLD AUTO: 219 X10*3/UL (ref 150–450)
POTASSIUM SERPL-SCNC: 3.8 MMOL/L (ref 3.5–5.3)
PROT SERPL-MCNC: 6.4 G/DL (ref 6.4–8.2)
PROT UR STRIP.AUTO-MCNC: NEGATIVE MG/DL
RBC # BLD AUTO: 4.15 X10*6/UL (ref 4–5.2)
RBC # UR STRIP.AUTO: NEGATIVE /UL
SODIUM SERPL-SCNC: 133 MMOL/L (ref 136–145)
SP GR UR STRIP.AUTO: 1.01
UROBILINOGEN UR STRIP.AUTO-MCNC: NORMAL MG/DL
WBC # BLD AUTO: 16.2 X10*3/UL (ref 4.4–11.3)

## 2024-06-12 PROCEDURE — 2500000004 HC RX 250 GENERAL PHARMACY W/ HCPCS (ALT 636 FOR OP/ED): Performed by: OBSTETRICS & GYNECOLOGY

## 2024-06-12 PROCEDURE — 2500000005 HC RX 250 GENERAL PHARMACY W/O HCPCS: Performed by: OBSTETRICS & GYNECOLOGY

## 2024-06-12 PROCEDURE — 81003 URINALYSIS AUTO W/O SCOPE: CPT | Performed by: OBSTETRICS & GYNECOLOGY

## 2024-06-12 PROCEDURE — 85025 COMPLETE CBC W/AUTO DIFF WBC: CPT | Performed by: OBSTETRICS & GYNECOLOGY

## 2024-06-12 PROCEDURE — 99215 OFFICE O/P EST HI 40 MIN: CPT

## 2024-06-12 PROCEDURE — 80053 COMPREHEN METABOLIC PANEL: CPT | Performed by: OBSTETRICS & GYNECOLOGY

## 2024-06-12 PROCEDURE — 99214 OFFICE O/P EST MOD 30 MIN: CPT | Performed by: OBSTETRICS & GYNECOLOGY

## 2024-06-12 PROCEDURE — 36415 COLL VENOUS BLD VENIPUNCTURE: CPT | Performed by: OBSTETRICS & GYNECOLOGY

## 2024-06-12 RX ORDER — ONDANSETRON 4 MG/1
4 TABLET, FILM COATED ORAL EVERY 6 HOURS PRN
Status: DISCONTINUED | OUTPATIENT
Start: 2024-06-12 | End: 2024-06-12 | Stop reason: HOSPADM

## 2024-06-12 RX ORDER — ONDANSETRON 4 MG/1
4 TABLET, FILM COATED ORAL EVERY 6 HOURS PRN
Qty: 20 TABLET | Refills: 0 | Status: SHIPPED | OUTPATIENT
Start: 2024-06-12

## 2024-06-12 RX ORDER — ACETAMINOPHEN 325 MG/1
975 TABLET ORAL ONCE
Status: COMPLETED | OUTPATIENT
Start: 2024-06-12 | End: 2024-06-12

## 2024-06-12 RX ORDER — SODIUM CHLORIDE, SODIUM LACTATE, POTASSIUM CHLORIDE, CALCIUM CHLORIDE 600; 310; 30; 20 MG/100ML; MG/100ML; MG/100ML; MG/100ML
125 INJECTION, SOLUTION INTRAVENOUS CONTINUOUS
Status: DISCONTINUED | OUTPATIENT
Start: 2024-06-12 | End: 2024-06-12 | Stop reason: HOSPADM

## 2024-06-12 SDOH — SOCIAL STABILITY: SOCIAL INSECURITY: HAVE YOU HAD ANY THOUGHTS OF HARMING ANYONE ELSE?: NO

## 2024-06-12 SDOH — SOCIAL STABILITY: SOCIAL INSECURITY: ARE THERE ANY APPARENT SIGNS OF INJURIES/BEHAVIORS THAT COULD BE RELATED TO ABUSE/NEGLECT?: NO

## 2024-06-12 SDOH — SOCIAL STABILITY: SOCIAL INSECURITY: HAS ANYONE EVER THREATENED TO HURT YOUR FAMILY OR YOUR PETS?: NO

## 2024-06-12 SDOH — HEALTH STABILITY: MENTAL HEALTH: SUICIDAL BEHAVIOR (LIFETIME): NO

## 2024-06-12 SDOH — SOCIAL STABILITY: SOCIAL INSECURITY: VERBAL ABUSE: DENIES

## 2024-06-12 SDOH — SOCIAL STABILITY: SOCIAL INSECURITY: DO YOU FEEL ANYONE HAS EXPLOITED OR TAKEN ADVANTAGE OF YOU FINANCIALLY OR OF YOUR PERSONAL PROPERTY?: NO

## 2024-06-12 SDOH — SOCIAL STABILITY: SOCIAL INSECURITY: ABUSE SCREEN: ADULT

## 2024-06-12 SDOH — SOCIAL STABILITY: SOCIAL INSECURITY: HAVE YOU HAD THOUGHTS OF HARMING ANYONE ELSE?: NO

## 2024-06-12 SDOH — HEALTH STABILITY: MENTAL HEALTH: WERE YOU ABLE TO COMPLETE ALL THE BEHAVIORAL HEALTH SCREENINGS?: YES

## 2024-06-12 SDOH — SOCIAL STABILITY: SOCIAL INSECURITY: ARE YOU OR HAVE YOU BEEN THREATENED OR ABUSED PHYSICALLY, EMOTIONALLY, OR SEXUALLY BY ANYONE?: NO

## 2024-06-12 SDOH — SOCIAL STABILITY: SOCIAL INSECURITY: DOES ANYONE TRY TO KEEP YOU FROM HAVING/CONTACTING OTHER FRIENDS OR DOING THINGS OUTSIDE YOUR HOME?: NO

## 2024-06-12 SDOH — SOCIAL STABILITY: SOCIAL INSECURITY: PHYSICAL ABUSE: DENIES

## 2024-06-12 SDOH — HEALTH STABILITY: MENTAL HEALTH: NON-SPECIFIC ACTIVE SUICIDAL THOUGHTS (PAST 1 MONTH): NO

## 2024-06-12 SDOH — HEALTH STABILITY: MENTAL HEALTH: WISH TO BE DEAD (PAST 1 MONTH): NO

## 2024-06-12 SDOH — ECONOMIC STABILITY: HOUSING INSECURITY: DO YOU FEEL UNSAFE GOING BACK TO THE PLACE WHERE YOU ARE LIVING?: NO

## 2024-06-12 ASSESSMENT — LIFESTYLE VARIABLES
SKIP TO QUESTIONS 9-10: 1
HOW MANY STANDARD DRINKS CONTAINING ALCOHOL DO YOU HAVE ON A TYPICAL DAY: PATIENT DOES NOT DRINK
AUDIT-C TOTAL SCORE: 0
AUDIT-C TOTAL SCORE: 0
HOW OFTEN DO YOU HAVE 6 OR MORE DRINKS ON ONE OCCASION: NEVER
HOW OFTEN DO YOU HAVE A DRINK CONTAINING ALCOHOL: NEVER

## 2024-06-12 ASSESSMENT — PAIN SCALES - GENERAL
PAINLEVEL_OUTOF10: 5 - MODERATE PAIN
PAINLEVEL_OUTOF10: 7

## 2024-06-12 ASSESSMENT — PATIENT HEALTH QUESTIONNAIRE - PHQ9
2. FEELING DOWN, DEPRESSED OR HOPELESS: NOT AT ALL
SUM OF ALL RESPONSES TO PHQ9 QUESTIONS 1 & 2: 0
1. LITTLE INTEREST OR PLEASURE IN DOING THINGS: NOT AT ALL

## 2024-06-12 ASSESSMENT — PAIN DESCRIPTION - LOCATION: LOCATION: BACK

## 2024-06-12 ASSESSMENT — ACTIVITIES OF DAILY LIVING (ADL): LACK_OF_TRANSPORTATION: NO

## 2024-06-12 NOTE — PROGRESS NOTES
"29 yo  @ 31 4/7 wks GA HD#1 admitted with right flank/back pain  Pt doing well overall. Continued pain in right flank wrapping around to anterior pelvis.   Pain controlled x 4 hours with tylenol, starting to wear off. No fever. No other concerns.     /58   Pulse 95   Temp 37 °C (98.6 °F) (Oral)   Resp 18   Ht 1.626 m (5' 4\")   Wt 127 kg (280 lb 3.3 oz)   LMP 2023 (Exact Date)   SpO2 96%   BMI 48.10 kg/m²    Heart Rate:  [86-95]   Temp:  [37 °C (98.6 °F)]   Resp:  [18]   BP: (118)/(58)   Height:  [162.6 cm (5' 4\")]   Weight:  [127 kg (280 lb 3.3 oz)]   SpO2:  [93 %-96 %]      Physical Exam  Constitutional:       Appearance: Normal appearance.   HENT:      Head: Normocephalic and atraumatic.   Eyes:      Extraocular Movements: Extraocular movements intact.      Conjunctiva/sclera: Conjunctivae normal.      Pupils: Pupils are equal, round, and reactive to light.   Pulmonary:      Effort: Pulmonary effort is normal.   Skin:     General: Skin is dry.   Neurological:      General: No focal deficit present.      Mental Status: She is alert.   Psychiatric:         Mood and Affect: Mood normal.         Behavior: Behavior normal.         Thought Content: Thought content normal.         Judgment: Judgment normal.        29 yo  @ 31 4/7 wks GA HD#1 admitted with right flank/back pain  - presumed UTI, possible pyelonephritis vs nephrolithiasis  - labs notable for leukocytosis WBC 16.2  - urine dip with large blood and trace protein, on macrobid for presumed UTI (started in office) with Ucx results pending.   - no evidence of labor.   - pain controlled overall with tylenol. This is not due for another two hours and she is getting uncomfortable. Offered oxycodone for pain control and pt declines.   - discussed options with patient for continued management. Reviewed pain precautions and when to call/return. Or pt to call with other symptoms, especially if she gets a fever.   - antibiotics to be " adjusted when Ucx results return and this can also be done outpatient.  - alternatively, we could pursue imaging to evaluate for kidney stones given her pain pattern and history of stones.  - she will discuss with FOB who was with her and let us know how they would like to proceed.

## 2024-06-12 NOTE — CARE PLAN
Problem: Antepartum  Goal: Maintain pregnancy as long as maternal and/or fetal condition is stable  Outcome: Progressing  Goal: Avoid/minimize constipation  Outcome: Progressing  Goal: No decrease in circulation/VTE  Outcome: Progressing  Goal: FHR remains reassuring  Outcome: Progressing  Goal: Minimize anxiety/maximize coping  Outcome: Progressing     Problem: Pain - Adult  Goal: Verbalizes/displays adequate comfort level or baseline comfort level  Outcome: Progressing     Problem: Safety - Adult  Goal: Free from fall injury  Outcome: Progressing     Problem: Discharge Planning  Goal: Discharge to home or other facility with appropriate resources  Outcome: Progressing       The clinical goals for the shift include pt experiences decrease in pain post interventions    Over the shift, the patient did make progress toward the following goals. Barriers to progression include n/a. Recommendations to address these barriers include n/a.

## 2024-06-12 NOTE — H&P
Obstetrical Admission History and Physical     Monse White is a 28 y.o. . Flank pain    Chief Complaint: No chief complaint on file.    Assessment/Plan    Admit to L&D triage  CBC, CMP, UAS  Will give 1 L LR IVF  Tylenol 975 mg and heat packs for comfort  If no improvement in symptoms plan for renal ultrasound/Flomax/urology assessment  FHT reassuring    Principal Problem:    Flank pain      Pregnancy Problems (from 24 to present)       Problem Noted Resolved    Flank pain 2024 by Farnaz Francis DO No    Priority:  Medium      Overview Signed 2024  3:00 AM by Farnaz Francis DO     Seen for back/pelvic pain and noted to have hematuria. Ucx pending 6/10, patient started empirically on Macrobid  Seen in triage 24 with worsening right flank pain   CBC, CMP, UAS, LR, Tylenol. If no improvement plan for renal US         Supervision of other normal pregnancy, antepartum ([facility]) 2024 by Moraima Kim MD No    Priority:  Medium      Late prenatal care affecting pregnancy in second trimester ([facility]) 3/5/2024 by LILLIANA Camacho-CNP No    Priority:  Medium            Subjective   Monse is here complaining of right sided flank pain. She was seen in the office 2 days ago for back and pelvic pain. At that time she was noted to have hematuria on POCT UAS. Her urine was sent for culture and she was empirically started on Macrobid. She initially felt a little better then had significant worsening of right sided flank pain yesterday that has worsened over time. She has not tried any OTC pain medication at home. She denies fever, chills, myalgias. She has had poor appetite/PO intake over the last 24 hours. Good fetal movement. Denies vaginal bleeding., Denies contractions., Denies leaking of fluid.           Obstetrical History   OB History    Para Term  AB Living   5 3 3 0 1 3   SAB IAB Ectopic Multiple Live Births   1 0 0 0 3      # Outcome Date  GA Lbr Larry/2nd Weight Sex Delivery Anes PTL Lv   5 Current            4 Term 04/04/23   3.771 kg M Vag-Spont EPI N DAVID      Complications: High blood pressure   3 Term 06/30/21 39w0d  3.856 kg M Vag-Spont EPI N    2 Term 12/10/19 39w0d  3.289 kg F Vag-Spont EPI N DAVID   1 SAB 01/2019               Past Medical History  No past medical history on file.     Past Surgical History   Past Surgical History:   Procedure Laterality Date    APPENDECTOMY  09/05/2013    Appendectomy       Social History  Social History     Tobacco Use    Smoking status: Never    Smokeless tobacco: Never   Substance Use Topics    Alcohol use: Not Currently     Substance and Sexual Activity   Drug Use Never       Allergies  Penicillins     Medications  Medications Prior to Admission   Medication Sig Dispense Refill Last Dose    aspirin 81 mg capsule Take 162 mg by mouth.       L. acidophilus/Bifid. animalis 32 billion cell capsule Take by mouth.       nitrofurantoin, macrocrystal-monohydrate, (Macrobid) 100 mg capsule Take 1 capsule (100 mg) by mouth 2 times a day for 7 days. 14 capsule 0     prenatal vit no.124/iron/folic (PRENATAL VITAMIN ORAL) Take by mouth.          Objective    Last Vitals  Temp Pulse Resp BP MAP O2 Sat                   Physical Examination  GENERAL: Examination reveals a well developed, well nourished, gravid female in no acute distress. She is alert and cooperative.  LUNGS: clear to auscultation bilaterally  HEART: regular rate and rhythm, S1, S2 normal, no murmur, click, rub or gallop  ABDOMEN: soft, gravid, nontender, nondistended, no abnormal masses, no epigastric pain, no CVA tenderness to percussion  FHR is 155 , with  , and a Category I tracing.    [place] reading: quiet    CERVIX: Closed cm dilated, 50 % effaced, -3 station; MEMBRANES are Intact  EXTREMITIES: no redness or tenderness in the calves or thighs, no edema  SKIN: normal coloration and turgor, no rashes  NEUROLOGICAL: alert, oriented, normal speech, no focal  findings or movement disorder noted  PSYCHOLOGICAL: awake and alert; oriented to person, place, and time    Lab Review  Ucx from 6/10 pending    Farnaz Francis DO

## 2024-06-12 NOTE — DISCHARGE INSTRUCTIONS
[facility]  [address], [place] [address]  [address], [place] [address]    OB Triage Discharge    Future Appointments   Date Time Provider Department [place]   6/21/2024  9:00 AM Farnaz Francis DO [id number] [facility]   7/2/2024 12:30 PM Kelsea Zacarias, APRN-CNP [id number] [facility]   7/15/2024 10:00 AM Tabatha Cardozo MD [id number] [facility]   7/16/2024 10:15 AM MAC [id number] OBGYNIMG ULTRASOUND 4 [id number] MAC [place] [facility]   7/23/2024 12:30 PM Cecy Michele MD [id number] [facility]   7/30/2024  1:00 PM Jose Enriquez MD [id number] [place]       Please keep all upcoming scheduled appointments  If you do not have a scheduled appointment for routine care, please call the office to schedule one in the next 1-2 weeks    Call the office immediately for any of the following symptoms:  Painful contractions, regular contractions that are worsening over time, or any new/persistent pelvic or abdominal pain  Leaking amniotic fluid  Vaginal bleeding  Decreased or absent fetal movement  Fever  Persistent or worsening pain    Call [phone] [place] [facility] or [phone] [place] [facility]

## 2024-06-12 NOTE — CARE PLAN
The patient's goals for the shift include      The clinical goals for the shift include pt experiences decrease in pain post interventions    Over the shift, the patient did not make progress toward the following goals. Barriers to progression include none. Recommendations to address these barriers include none  Problem: Antepartum  Goal: Maintain pregnancy as long as maternal and/or fetal condition is stable  Outcome: Met  Goal: Avoid/minimize constipation  Outcome: Met  Goal: No decrease in circulation/VTE  Outcome: Met  Goal: FHR remains reassuring  Outcome: Met  Goal: Minimize anxiety/maximize coping  Outcome: Met     Problem: Pain - Adult  Goal: Verbalizes/displays adequate comfort level or baseline comfort level  Outcome: Met     Problem: Safety - Adult  Goal: Free from fall injury  Outcome: Met     Problem: Discharge Planning  Goal: Discharge to home or other facility with appropriate resources  Outcome: Met   .

## 2024-06-21 ENCOUNTER — APPOINTMENT (OUTPATIENT)
Dept: OBSTETRICS AND GYNECOLOGY | Facility: CLINIC | Age: 29
End: 2024-06-21
Payer: COMMERCIAL

## 2024-06-21 VITALS — BODY MASS INDEX: 48.06 KG/M2 | WEIGHT: 280 LBS | SYSTOLIC BLOOD PRESSURE: 118 MMHG | DIASTOLIC BLOOD PRESSURE: 80 MMHG

## 2024-06-21 DIAGNOSIS — Z34.80 SUPERVISION OF OTHER NORMAL PREGNANCY, ANTEPARTUM (HHS-HCC): ICD-10-CM

## 2024-06-21 LAB
POC APPEARANCE, URINE: ABNORMAL
POC BILIRUBIN, URINE: NEGATIVE
POC BLOOD, URINE: ABNORMAL
POC COLOR, URINE: YELLOW
POC GLUCOSE, URINE: NEGATIVE MG/DL
POC KETONES, URINE: ABNORMAL MG/DL
POC LEUKOCYTES, URINE: ABNORMAL
POC NITRITE,URINE: NEGATIVE
POC PH, URINE: 5 PH
POC PROTEIN, URINE: ABNORMAL MG/DL
POC SPECIFIC GRAVITY, URINE: 1.02
POC UROBILINOGEN, URINE: 0.2 EU/DL

## 2024-06-21 PROCEDURE — 87086 URINE CULTURE/COLONY COUNT: CPT

## 2024-06-21 NOTE — PROGRESS NOTES
Subjective   Patient ID [id number]   Monse White is a 28 y.o.  at 32w6d with a working estimated date of delivery of 8/10/2024, by Last Menstrual Period who presents for a routine prenatal visit. She denies vaginal bleeding, leakage of fluid, decreased fetal movements, or contractions.    Her pregnancy is complicated by:  CHTN  Obesity    Objective   Physical Exam:   Weight: 127 kg (280 lb)  Expected Total Weight Gain: 5 kg (11 lb)-9 kg (19 lb)   Pregravid BMI: 43.92  BP: 118/80                  Prenatal Labs  Urine Dip:  Lab Results   Component Value Date    KETONESU 80 (3+) (A) 2024     Lab Results   Component Value Date    HGB 11.9 (L) 2024    HCT 36.0 2024    ABO O 2024    HEPBSAG Nonreactive 2024           Imaging: Growth US scheduled for 36 weeks      Assessment/Plan   Problem List Items Addressed This Visit             ICD-10-CM    Supervision of other normal pregnancy, antepartum ([facility]) Z34.80    Relevant Orders    POCT UA (nonautomated) manually resulted (Completed)    Urine Culture     Continue prenatal vitamin.  Labs reviewed.  GBS 36 weeks  Expected mode of delivery TBD  Follow up in 2 week for a routine prenatal visit/NST  Farnaz Francis DO

## 2024-06-21 NOTE — PATIENT INSTRUCTIONS
You were seen in the office today for routine OB care and had normal findings on exam  Continue routine OB precautions at home  Continue taking prenatal vitamins   Avoid sick contacts and consider getting your Flu (available in office during flu season) and COVID vaccines to protect against infection in pregnancy  We recommend getting the Tdap (available in office) and RSV vaccines to pass some immunity from mother to baby and protect your baby against RSV and Whooping cough in the first few months of life. Tdap can be given between 27 and 36 weeks, and RSV vaccinations can be given between 32 and 36 weeks gestation  Make an appointment for routine care in the office in the next 2 weeks  If you are having any painful contractions, vaginal bleeding, leaking amniotic fluid, or decrease in baby's movements prior to your next visit please call the office to speak to the physician on call. This includes after hours, weekends, and holidays, when the answering service will be able to connect you with the physician on call. [phone] ([place] Office) or [phone] ([place] Office).

## 2024-06-22 LAB — BACTERIA UR CULT: NORMAL

## 2024-07-02 ENCOUNTER — APPOINTMENT (OUTPATIENT)
Dept: OBSTETRICS AND GYNECOLOGY | Facility: CLINIC | Age: 29
End: 2024-07-02
Payer: COMMERCIAL

## 2024-07-02 VITALS — WEIGHT: 284.6 LBS | DIASTOLIC BLOOD PRESSURE: 84 MMHG | BODY MASS INDEX: 48.85 KG/M2 | SYSTOLIC BLOOD PRESSURE: 136 MMHG

## 2024-07-02 DIAGNOSIS — O10.919 PRE-EXISTING HYPERTENSION DURING PREGNANCY, ANTEPARTUM, UNSPECIFIED PRE-EXISTING HYPERTENSION TYPE (HHS-HCC): ICD-10-CM

## 2024-07-02 DIAGNOSIS — Z34.83 PRENATAL CARE, SUBSEQUENT PREGNANCY, THIRD TRIMESTER (HHS-HCC): Primary | ICD-10-CM

## 2024-07-02 DIAGNOSIS — Z34.80 SUPERVISION OF OTHER NORMAL PREGNANCY, ANTEPARTUM (HHS-HCC): ICD-10-CM

## 2024-07-02 DIAGNOSIS — O99.210 OBESITY IN PREGNANCY (HHS-HCC): ICD-10-CM

## 2024-07-02 LAB
POC APPEARANCE, URINE: CLEAR
POC BLOOD, URINE: NEGATIVE
POC COLOR, URINE: NORMAL
POC GLUCOSE, URINE: NEGATIVE MG/DL
POC KETONES, URINE: NEGATIVE MG/DL
POC LEUKOCYTES, URINE: NEGATIVE
POC NITRITE,URINE: NEGATIVE
POC PROTEIN, URINE: NEGATIVE MG/DL

## 2024-07-02 PROCEDURE — 99213 OFFICE O/P EST LOW 20 MIN: CPT | Performed by: NURSE PRACTITIONER

## 2024-07-02 PROCEDURE — 81003 URINALYSIS AUTO W/O SCOPE: CPT | Performed by: NURSE PRACTITIONER

## 2024-07-02 PROCEDURE — 59025 FETAL NON-STRESS TEST: CPT | Performed by: NURSE PRACTITIONER

## 2024-07-02 NOTE — PROCEDURES
Monse White, a  at 34w3d with an NATACHA of 8/10/2024, by Last Menstrual Period, was seen at [facility] for a nonstress test.    Non-Stress Test   Baseline Fetal Heart Rate for Non-Stress Test: 145 BPM  Variability in Waveform for Non-Stress Test: Moderate  Accelerations in Non-Stress Test: Yes, greater than/equal to 15 bpm, lasting at least 15 seconds  Decelerations in Non-Stress Test: None  Acoustic Stimulator for Non-Stress Test: No  Interpretation of Non-Stress Test   Interpretation of Non-Stress Test: Reactive  Comments on Non-Stress Test: Category 1  NST for Multiple Fetuses: No

## 2024-07-02 NOTE — PROGRESS NOTES
Monse White is a 28 y.o.  who presents at 34w3d with Estimated Date of Delivery: 8/10/24 for a routine prenatal visit.   She is feeling well. Reports fatigue. Baby is active.   Plan for continued weekly NSTs. Next growth US at 36 weeks.   Planning on breastfeeding.     Her pregnancy is complicated by:  CHTN  Obesity    Objective   Fetal Heart Rate: 145  Movement: Present  BP: 136/84  Weight  Weight: 129 kg (284 lb 9.6 oz)  Urine Dipstick  Urine Protein: NEGATIVE  Urine Leukocytes: NEGATIVE  Urine Ketone: NEGATIVE  Urine Glucose: NEGATIVE      Non-Stress Test   Baseline Fetal Heart Rate for Non-Stress Test: 145 BPM  Variability in Waveform for Non-Stress Test: Moderate  Accelerations in Non-Stress Test: Yes, greater than/equal to 15 bpm, lasting at least 15 seconds  Decelerations in Non-Stress Test: None  Acoustic Stimulator for Non-Stress Test: No  Interpretation of Non-Stress Test   Interpretation of Non-Stress Test: Reactive  Comments on Non-Stress Test: Category 1  NST for Multiple Fetuses: No                             Assessment/Plan   Diagnoses and all orders for this visit:  Prenatal care, subsequent pregnancy, third trimester ([facility])  Obesity in pregnancy ([facility])  Pre-existing hypertension during pregnancy, antepartum, unspecified pre-existing hypertension type ([facility])  -     Fetal nonstress test; Future  NST reactive, category 1  Weekly NSTs  Growth US 36 weeks  Follow up 1 week or sooner if needed

## 2024-07-03 ENCOUNTER — APPOINTMENT (OUTPATIENT)
Dept: OBSTETRICS AND GYNECOLOGY | Facility: CLINIC | Age: 29
End: 2024-07-03
Payer: COMMERCIAL

## 2024-07-05 ENCOUNTER — TELEPHONE (OUTPATIENT)
Dept: OBSTETRICS AND GYNECOLOGY | Facility: CLINIC | Age: 29
End: 2024-07-05
Payer: COMMERCIAL

## 2024-07-08 ENCOUNTER — APPOINTMENT (OUTPATIENT)
Dept: OBSTETRICS AND GYNECOLOGY | Facility: CLINIC | Age: 29
End: 2024-07-08
Payer: COMMERCIAL

## 2024-07-08 VITALS — BODY MASS INDEX: 48.71 KG/M2 | WEIGHT: 283.8 LBS | SYSTOLIC BLOOD PRESSURE: 130 MMHG | DIASTOLIC BLOOD PRESSURE: 78 MMHG

## 2024-07-08 DIAGNOSIS — Z34.80 SUPERVISION OF OTHER NORMAL PREGNANCY, ANTEPARTUM (HHS-HCC): ICD-10-CM

## 2024-07-08 DIAGNOSIS — O36.8131 DECREASED FETAL MOVEMENTS IN THIRD TRIMESTER, FETUS 1 OF MULTIPLE GESTATION (HHS-HCC): ICD-10-CM

## 2024-07-08 DIAGNOSIS — Z3A.28 28 WEEKS GESTATION OF PREGNANCY (HHS-HCC): Primary | ICD-10-CM

## 2024-07-08 LAB
POC APPEARANCE, URINE: CLEAR
POC BLOOD, URINE: NEGATIVE
POC COLOR, URINE: YELLOW
POC GLUCOSE, URINE: NEGATIVE MG/DL
POC KETONES, URINE: NEGATIVE MG/DL
POC LEUKOCYTES, URINE: NEGATIVE
POC NITRITE,URINE: NEGATIVE
POC PROTEIN, URINE: NEGATIVE MG/DL

## 2024-07-08 PROCEDURE — 99213 OFFICE O/P EST LOW 20 MIN: CPT | Performed by: OBSTETRICS & GYNECOLOGY

## 2024-07-08 PROCEDURE — 81003 URINALYSIS AUTO W/O SCOPE: CPT | Performed by: OBSTETRICS & GYNECOLOGY

## 2024-07-08 NOTE — PROGRESS NOTES
HPI  Monse White is a 28 y.o.  @ 35w1d with Estimated Date of Delivery: 8/10/24 for NST.    She is feeling well. Reports fatigue. Baby is active.   Not monitoring her blood pressure at home as it has been normal in office    Pregnancy complicated by  - BMI 48   - chronic hypertension- no antihypertensives.     US last 2024 normal interval growth, EFW 39%          Non-Stress Test   Baseline Fetal Heart Rate for Non-Stress Test: 150 BPM  Variability in Waveform for Non-Stress Test: Moderate  Accelerations in Non-Stress Test: Yes, lasting at least 15 seconds, greater than/equal to 15 bpm  Decelerations in Non-Stress Test: None  Interpretation of Non-Stress Test   Interpretation of Non-Stress Test: Reactive                          NST reactive  Serial growth US  RTC 1 wk     Scribe Attestation  By signing my name below, ISlime, Emmanuel attest that this documentation has been prepared under the direction and in the presence of Jose Enriquez MD. All medical record entries made by the Scribe were at my direction or personally dictated by me. I have reviewed the chart and agree that the record accurately reflects my personal performance of the history, physical exam, discussion and plan.

## 2024-07-15 ENCOUNTER — APPOINTMENT (OUTPATIENT)
Dept: OBSTETRICS AND GYNECOLOGY | Facility: CLINIC | Age: 29
End: 2024-07-15
Payer: COMMERCIAL

## 2024-07-15 VITALS — DIASTOLIC BLOOD PRESSURE: 72 MMHG | WEIGHT: 284 LBS | BODY MASS INDEX: 48.75 KG/M2 | SYSTOLIC BLOOD PRESSURE: 116 MMHG

## 2024-07-15 DIAGNOSIS — O99.210 OBESITY IN PREGNANCY (HHS-HCC): ICD-10-CM

## 2024-07-15 DIAGNOSIS — Z34.80 SUPERVISION OF OTHER NORMAL PREGNANCY, ANTEPARTUM (HHS-HCC): Primary | ICD-10-CM

## 2024-07-15 DIAGNOSIS — O10.919 PRE-EXISTING HYPERTENSION DURING PREGNANCY, ANTEPARTUM, UNSPECIFIED PRE-EXISTING HYPERTENSION TYPE (HHS-HCC): ICD-10-CM

## 2024-07-15 PROBLEM — R10.9 FLANK PAIN: Status: RESOLVED | Noted: 2024-06-12 | Resolved: 2024-07-15

## 2024-07-15 LAB
POC APPEARANCE, URINE: CLEAR
POC BILIRUBIN, URINE: NEGATIVE
POC BLOOD, URINE: NEGATIVE
POC COLOR, URINE: YELLOW
POC GLUCOSE, URINE: NEGATIVE MG/DL
POC KETONES, URINE: NEGATIVE MG/DL
POC LEUKOCYTES, URINE: NEGATIVE
POC NITRITE,URINE: NEGATIVE
POC PH, URINE: 6.5 PH
POC PROTEIN, URINE: NEGATIVE MG/DL
POC SPECIFIC GRAVITY, URINE: 1.02
POC UROBILINOGEN, URINE: 0.2 EU/DL

## 2024-07-15 PROCEDURE — 0501F PRENATAL FLOW SHEET: CPT | Performed by: OBSTETRICS & GYNECOLOGY

## 2024-07-15 PROCEDURE — 87081 CULTURE SCREEN ONLY: CPT

## 2024-07-16 ENCOUNTER — HOSPITAL ENCOUNTER (OUTPATIENT)
Dept: RADIOLOGY | Facility: CLINIC | Age: 29
Discharge: HOME | End: 2024-07-16
Payer: COMMERCIAL

## 2024-07-16 DIAGNOSIS — O10.919 PRE-EXISTING HYPERTENSION DURING PREGNANCY, ANTEPARTUM, UNSPECIFIED PRE-EXISTING HYPERTENSION TYPE (HHS-HCC): ICD-10-CM

## 2024-07-16 DIAGNOSIS — O99.210 OBESITY IN PREGNANCY (HHS-HCC): ICD-10-CM

## 2024-07-16 PROCEDURE — 76819 FETAL BIOPHYS PROFIL W/O NST: CPT

## 2024-07-16 PROCEDURE — 76816 OB US FOLLOW-UP PER FETUS: CPT

## 2024-07-16 PROCEDURE — 76819 FETAL BIOPHYS PROFIL W/O NST: CPT | Performed by: OBSTETRICS & GYNECOLOGY

## 2024-07-16 PROCEDURE — 76816 OB US FOLLOW-UP PER FETUS: CPT | Performed by: OBSTETRICS & GYNECOLOGY

## 2024-07-18 LAB — GP B STREP GENITAL QL CULT: NORMAL

## 2024-07-22 NOTE — PROGRESS NOTES
Subjective   Patient ID [id number]   Monse White is a 28 y.o.  at 37w3d, +FM, no contractions. For NST today. Has growth scan last week and baby measuring large.     Her pregnancy is complicated by:  CHTN  Pregravid BMI 43.92       Objective   Physical Exam  Weight: 130 kg (286 lb)  BP: 116/78  Fetal Heart Rate: 150 Fundal Height (cm): 40 cm  NST reactive  Cx: cl/50/-4    Lab Results   Component Value Date    HGB 11.9 (L) 2024    HCT 36.0 2024       Assessment/Plan   Induction scheduled for  at 9pm.   Follow up in 1 weeks for a routine prenatal visit and NST.

## 2024-07-23 ENCOUNTER — APPOINTMENT (OUTPATIENT)
Dept: OBSTETRICS AND GYNECOLOGY | Facility: CLINIC | Age: 29
End: 2024-07-23
Payer: COMMERCIAL

## 2024-07-23 VITALS — BODY MASS INDEX: 49.09 KG/M2 | WEIGHT: 286 LBS | SYSTOLIC BLOOD PRESSURE: 116 MMHG | DIASTOLIC BLOOD PRESSURE: 78 MMHG

## 2024-07-23 DIAGNOSIS — Z34.80 SUPERVISION OF OTHER NORMAL PREGNANCY, ANTEPARTUM (HHS-HCC): ICD-10-CM

## 2024-07-23 DIAGNOSIS — O99.210 OBESITY IN PREGNANCY (HHS-HCC): ICD-10-CM

## 2024-07-23 DIAGNOSIS — O09.32 LATE PRENATAL CARE AFFECTING PREGNANCY IN SECOND TRIMESTER (HHS-HCC): ICD-10-CM

## 2024-07-23 DIAGNOSIS — O36.63X1 EXCESSIVE FETAL GROWTH AFFECTING MANAGEMENT OF PREGNANCY IN THIRD TRIMESTER, FETUS 1 OF MULTIPLE GESTATION (HHS-HCC): Primary | ICD-10-CM

## 2024-07-23 LAB
POC APPEARANCE, URINE: CLEAR
POC BILIRUBIN, URINE: NEGATIVE
POC BLOOD, URINE: NEGATIVE
POC COLOR, URINE: YELLOW
POC GLUCOSE, URINE: NEGATIVE MG/DL
POC KETONES, URINE: NEGATIVE MG/DL
POC LEUKOCYTES, URINE: NEGATIVE
POC NITRITE,URINE: NEGATIVE
POC PH, URINE: 7 PH
POC PROTEIN, URINE: NEGATIVE MG/DL
POC SPECIFIC GRAVITY, URINE: 1.01
POC UROBILINOGEN, URINE: 0.2 EU/DL

## 2024-07-23 PROCEDURE — 99213 OFFICE O/P EST LOW 20 MIN: CPT | Performed by: OBSTETRICS & GYNECOLOGY

## 2024-07-23 PROCEDURE — 59025 FETAL NON-STRESS TEST: CPT | Performed by: OBSTETRICS & GYNECOLOGY

## 2024-07-23 PROCEDURE — 81002 URINALYSIS NONAUTO W/O SCOPE: CPT | Performed by: OBSTETRICS & GYNECOLOGY

## 2024-07-29 NOTE — PROGRESS NOTES
Monse White is a 28 y.o.  at 38w3d with a working estimated date of delivery of 8/10/2024, by Last Menstrual Period who presents for a NST.   No acute concerns.  +FM. No ctx, VB or LOF.    Her pregnancy is complicated by:  - CHTN  - Pregravid BMI 43.92     Continue prenatal vitamin.  Labs reviewed.  GBS negative.    NST reactive  155 mod godwin +accels no decels.    For scheduled IOL on  @9 pm   Cephalic on US 24  Labor and PEC precautions reviewed.   CHTN Advised to take BP at home.    Scribe Attestation  By signing my name below, I, Emmanuel Longoria,   attest that this documentation has been prepared under the direction and in the presence of Jose Enriquez MD.

## 2024-07-30 ENCOUNTER — APPOINTMENT (OUTPATIENT)
Dept: OBSTETRICS AND GYNECOLOGY | Facility: CLINIC | Age: 29
End: 2024-07-30
Payer: COMMERCIAL

## 2024-07-30 VITALS — WEIGHT: 287 LBS | BODY MASS INDEX: 49.26 KG/M2 | DIASTOLIC BLOOD PRESSURE: 78 MMHG | SYSTOLIC BLOOD PRESSURE: 132 MMHG

## 2024-07-30 DIAGNOSIS — O09.32 LATE PRENATAL CARE AFFECTING PREGNANCY IN SECOND TRIMESTER (HHS-HCC): ICD-10-CM

## 2024-07-30 DIAGNOSIS — O99.213 OBESITY AFFECTING PREGNANCY IN THIRD TRIMESTER, UNSPECIFIED OBESITY TYPE (HHS-HCC): ICD-10-CM

## 2024-07-30 DIAGNOSIS — Z34.80 SUPERVISION OF OTHER NORMAL PREGNANCY, ANTEPARTUM (HHS-HCC): Primary | ICD-10-CM

## 2024-07-30 PROCEDURE — 99213 OFFICE O/P EST LOW 20 MIN: CPT | Performed by: OBSTETRICS & GYNECOLOGY

## 2024-07-30 NOTE — PROCEDURES
Procedures      Non-Stress Test   Baseline Fetal Heart Rate for Non-Stress Test: 155 BPM  Variability in Waveform for Non-Stress Test: Moderate  Accelerations in Non-Stress Test: lasting at least 15 seconds, greater than/equal to 15 bpm, Yes  Interpretation of Non-Stress Test   Interpretation of Non-Stress Test: Reactive

## 2024-08-04 ENCOUNTER — PREP FOR PROCEDURE (OUTPATIENT)
Dept: OBSTETRICS AND GYNECOLOGY | Facility: CLINIC | Age: 29
End: 2024-08-04
Payer: COMMERCIAL

## 2024-08-04 ENCOUNTER — HOSPITAL ENCOUNTER (INPATIENT)
Facility: HOSPITAL | Age: 29
LOS: 3 days | Discharge: HOME | End: 2024-08-07
Attending: OBSTETRICS & GYNECOLOGY | Admitting: OBSTETRICS & GYNECOLOGY
Payer: COMMERCIAL

## 2024-08-04 ENCOUNTER — APPOINTMENT (OUTPATIENT)
Dept: OBSTETRICS AND GYNECOLOGY | Facility: HOSPITAL | Age: 29
End: 2024-08-04
Payer: COMMERCIAL

## 2024-08-04 ENCOUNTER — PREP FOR PROCEDURE (OUTPATIENT)
Dept: OBSTETRICS AND GYNECOLOGY | Facility: HOSPITAL | Age: 29
End: 2024-08-04

## 2024-08-04 DIAGNOSIS — O99.213 OBESITY AFFECTING PREGNANCY IN THIRD TRIMESTER, UNSPECIFIED OBESITY TYPE (HHS-HCC): Primary | ICD-10-CM

## 2024-08-04 DIAGNOSIS — O10.919 CHRONIC HYPERTENSION IN PREGNANCY (HHS-HCC): ICD-10-CM

## 2024-08-04 DIAGNOSIS — O10.019 PRE-EXISTING ESSENTIAL HYPERTENSION DURING PREGNANCY, ANTEPARTUM (HHS-HCC): ICD-10-CM

## 2024-08-04 LAB
ABO GROUP (TYPE) IN BLOOD: NORMAL
ALBUMIN SERPL BCP-MCNC: 3.2 G/DL (ref 3.4–5)
ALP SERPL-CCNC: 90 U/L (ref 33–110)
ALT SERPL W P-5'-P-CCNC: 8 U/L (ref 7–45)
ANION GAP SERPL CALC-SCNC: 19 MMOL/L (ref 10–20)
ANTIBODY SCREEN: NORMAL
AST SERPL W P-5'-P-CCNC: 12 U/L (ref 9–39)
BILIRUB SERPL-MCNC: 0.4 MG/DL (ref 0–1.2)
BUN SERPL-MCNC: 7 MG/DL (ref 6–23)
CALCIUM SERPL-MCNC: 8.8 MG/DL (ref 8.6–10.3)
CHLORIDE SERPL-SCNC: 103 MMOL/L (ref 98–107)
CO2 SERPL-SCNC: 18 MMOL/L (ref 21–32)
CREAT SERPL-MCNC: 0.56 MG/DL (ref 0.5–1.05)
EGFRCR SERPLBLD CKD-EPI 2021: >90 ML/MIN/1.73M*2
ERYTHROCYTE [DISTWIDTH] IN BLOOD BY AUTOMATED COUNT: 13.9 % (ref 11.5–14.5)
GLUCOSE SERPL-MCNC: 90 MG/DL (ref 74–99)
HCT VFR BLD AUTO: 37.2 % (ref 36–46)
HGB BLD-MCNC: 12.2 G/DL (ref 12–16)
LDH SERPL L TO P-CCNC: 163 U/L (ref 84–246)
MCH RBC QN AUTO: 28.4 PG (ref 26–34)
MCHC RBC AUTO-ENTMCNC: 32.8 G/DL (ref 32–36)
MCV RBC AUTO: 87 FL (ref 80–100)
NRBC BLD-RTO: 0 /100 WBCS (ref 0–0)
PLATELET # BLD AUTO: 220 X10*3/UL (ref 150–450)
POTASSIUM SERPL-SCNC: 3.6 MMOL/L (ref 3.5–5.3)
PROT SERPL-MCNC: 6 G/DL (ref 6.4–8.2)
RBC # BLD AUTO: 4.29 X10*6/UL (ref 4–5.2)
RH FACTOR (ANTIGEN D): NORMAL
SODIUM SERPL-SCNC: 136 MMOL/L (ref 136–145)
URATE SERPL-MCNC: 4.8 MG/DL (ref 2.3–6.7)
WBC # BLD AUTO: 10.6 X10*3/UL (ref 4.4–11.3)

## 2024-08-04 PROCEDURE — 1120000001 HC OB PRIVATE ROOM DAILY

## 2024-08-04 PROCEDURE — 83615 LACTATE (LD) (LDH) ENZYME: CPT | Performed by: OBSTETRICS & GYNECOLOGY

## 2024-08-04 PROCEDURE — 86901 BLOOD TYPING SEROLOGIC RH(D): CPT | Performed by: OBSTETRICS & GYNECOLOGY

## 2024-08-04 PROCEDURE — 36415 COLL VENOUS BLD VENIPUNCTURE: CPT | Performed by: OBSTETRICS & GYNECOLOGY

## 2024-08-04 PROCEDURE — 84550 ASSAY OF BLOOD/URIC ACID: CPT | Performed by: OBSTETRICS & GYNECOLOGY

## 2024-08-04 PROCEDURE — 85027 COMPLETE CBC AUTOMATED: CPT | Performed by: OBSTETRICS & GYNECOLOGY

## 2024-08-04 PROCEDURE — 80053 COMPREHEN METABOLIC PANEL: CPT | Performed by: OBSTETRICS & GYNECOLOGY

## 2024-08-04 PROCEDURE — 2500000001 HC RX 250 WO HCPCS SELF ADMINISTERED DRUGS (ALT 637 FOR MEDICARE OP): Performed by: OBSTETRICS & GYNECOLOGY

## 2024-08-04 RX ORDER — HYDRALAZINE HYDROCHLORIDE 20 MG/ML
5 INJECTION INTRAMUSCULAR; INTRAVENOUS ONCE AS NEEDED
Status: CANCELLED | OUTPATIENT
Start: 2024-08-04

## 2024-08-04 RX ORDER — MISOPROSTOL 100 UG/1
25 TABLET ORAL
Status: CANCELLED | OUTPATIENT
Start: 2024-08-04 | End: 2024-08-04

## 2024-08-04 RX ORDER — METOCLOPRAMIDE 10 MG/1
10 TABLET ORAL EVERY 6 HOURS PRN
Status: DISCONTINUED | OUTPATIENT
Start: 2024-08-04 | End: 2024-08-05

## 2024-08-04 RX ORDER — NALBUPHINE HYDROCHLORIDE 10 MG/ML
10 INJECTION, SOLUTION INTRAMUSCULAR; INTRAVENOUS; SUBCUTANEOUS
Status: DISCONTINUED | OUTPATIENT
Start: 2024-08-04 | End: 2024-08-05

## 2024-08-04 RX ORDER — METOCLOPRAMIDE 10 MG/1
10 TABLET ORAL EVERY 6 HOURS PRN
Status: CANCELLED | OUTPATIENT
Start: 2024-08-04

## 2024-08-04 RX ORDER — METOCLOPRAMIDE HYDROCHLORIDE 5 MG/ML
10 INJECTION INTRAMUSCULAR; INTRAVENOUS EVERY 6 HOURS PRN
Status: DISCONTINUED | OUTPATIENT
Start: 2024-08-04 | End: 2024-08-05

## 2024-08-04 RX ORDER — SODIUM CHLORIDE, SODIUM LACTATE, POTASSIUM CHLORIDE, CALCIUM CHLORIDE 600; 310; 30; 20 MG/100ML; MG/100ML; MG/100ML; MG/100ML
125 INJECTION, SOLUTION INTRAVENOUS CONTINUOUS
Status: DISCONTINUED | OUTPATIENT
Start: 2024-08-04 | End: 2024-08-05

## 2024-08-04 RX ORDER — MISOPROSTOL 100 UG/1
25 TABLET ORAL ONCE
Status: CANCELLED | OUTPATIENT
Start: 2024-08-04

## 2024-08-04 RX ORDER — ONDANSETRON 4 MG/1
4 TABLET, FILM COATED ORAL EVERY 6 HOURS PRN
Status: DISCONTINUED | OUTPATIENT
Start: 2024-08-04 | End: 2024-08-05

## 2024-08-04 RX ORDER — METHYLERGONOVINE MALEATE 0.2 MG/ML
0.2 INJECTION INTRAVENOUS ONCE AS NEEDED
Status: CANCELLED | OUTPATIENT
Start: 2024-08-04

## 2024-08-04 RX ORDER — ONDANSETRON HYDROCHLORIDE 2 MG/ML
4 INJECTION, SOLUTION INTRAVENOUS EVERY 6 HOURS PRN
Status: DISCONTINUED | OUTPATIENT
Start: 2024-08-04 | End: 2024-08-05

## 2024-08-04 RX ORDER — NIFEDIPINE 10 MG/1
10 CAPSULE ORAL ONCE AS NEEDED
Status: CANCELLED | OUTPATIENT
Start: 2024-08-04

## 2024-08-04 RX ORDER — LIDOCAINE HYDROCHLORIDE 10 MG/ML
30 INJECTION INFILTRATION; PERINEURAL ONCE AS NEEDED
Status: CANCELLED | OUTPATIENT
Start: 2024-08-04

## 2024-08-04 RX ORDER — LABETALOL HYDROCHLORIDE 5 MG/ML
20 INJECTION, SOLUTION INTRAVENOUS ONCE AS NEEDED
Status: DISCONTINUED | OUTPATIENT
Start: 2024-08-04 | End: 2024-08-05

## 2024-08-04 RX ORDER — TRANEXAMIC ACID 100 MG/ML
1000 INJECTION, SOLUTION INTRAVENOUS ONCE AS NEEDED
Status: CANCELLED | OUTPATIENT
Start: 2024-08-04

## 2024-08-04 RX ORDER — ONDANSETRON HYDROCHLORIDE 2 MG/ML
4 INJECTION, SOLUTION INTRAVENOUS EVERY 6 HOURS PRN
Status: CANCELLED | OUTPATIENT
Start: 2024-08-04

## 2024-08-04 RX ORDER — NALBUPHINE HYDROCHLORIDE 10 MG/ML
10 INJECTION, SOLUTION INTRAMUSCULAR; INTRAVENOUS; SUBCUTANEOUS
Status: CANCELLED | OUTPATIENT
Start: 2024-08-04

## 2024-08-04 RX ORDER — LOPERAMIDE HYDROCHLORIDE 2 MG/1
4 CAPSULE ORAL EVERY 2 HOUR PRN
Status: CANCELLED | OUTPATIENT
Start: 2024-08-04

## 2024-08-04 RX ORDER — OXYTOCIN 10 [USP'U]/ML
10 INJECTION, SOLUTION INTRAMUSCULAR; INTRAVENOUS ONCE AS NEEDED
Status: CANCELLED | OUTPATIENT
Start: 2024-08-04

## 2024-08-04 RX ORDER — MISOPROSTOL 200 UG/1
800 TABLET ORAL ONCE AS NEEDED
Status: CANCELLED | OUTPATIENT
Start: 2024-08-04

## 2024-08-04 RX ORDER — LABETALOL HYDROCHLORIDE 5 MG/ML
20 INJECTION, SOLUTION INTRAVENOUS ONCE AS NEEDED
Status: CANCELLED | OUTPATIENT
Start: 2024-08-04

## 2024-08-04 RX ORDER — ONDANSETRON 4 MG/1
4 TABLET, FILM COATED ORAL EVERY 6 HOURS PRN
Status: CANCELLED | OUTPATIENT
Start: 2024-08-04

## 2024-08-04 RX ORDER — SODIUM CHLORIDE, SODIUM LACTATE, POTASSIUM CHLORIDE, CALCIUM CHLORIDE 600; 310; 30; 20 MG/100ML; MG/100ML; MG/100ML; MG/100ML
125 INJECTION, SOLUTION INTRAVENOUS CONTINUOUS
Status: CANCELLED | OUTPATIENT
Start: 2024-08-04

## 2024-08-04 RX ORDER — TERBUTALINE SULFATE 1 MG/ML
0.25 INJECTION SUBCUTANEOUS ONCE AS NEEDED
Status: CANCELLED | OUTPATIENT
Start: 2024-08-04

## 2024-08-04 RX ORDER — TERBUTALINE SULFATE 1 MG/ML
0.25 INJECTION SUBCUTANEOUS ONCE AS NEEDED
Status: DISCONTINUED | OUTPATIENT
Start: 2024-08-04 | End: 2024-08-05

## 2024-08-04 RX ORDER — CARBOPROST TROMETHAMINE 250 UG/ML
250 INJECTION, SOLUTION INTRAMUSCULAR ONCE AS NEEDED
Status: CANCELLED | OUTPATIENT
Start: 2024-08-04

## 2024-08-04 RX ORDER — METOCLOPRAMIDE HYDROCHLORIDE 5 MG/ML
10 INJECTION INTRAMUSCULAR; INTRAVENOUS EVERY 6 HOURS PRN
Status: CANCELLED | OUTPATIENT
Start: 2024-08-04

## 2024-08-04 RX ORDER — HYDRALAZINE HYDROCHLORIDE 20 MG/ML
5 INJECTION INTRAMUSCULAR; INTRAVENOUS ONCE AS NEEDED
Status: DISCONTINUED | OUTPATIENT
Start: 2024-08-04 | End: 2024-08-05

## 2024-08-04 RX ORDER — NIFEDIPINE 10 MG/1
10 CAPSULE ORAL ONCE AS NEEDED
Status: DISCONTINUED | OUTPATIENT
Start: 2024-08-04 | End: 2024-08-05

## 2024-08-04 RX ADMIN — MISOPROSTOL 25 MCG: 100 TABLET ORAL at 22:05

## 2024-08-04 SDOH — SOCIAL STABILITY: SOCIAL INSECURITY: ARE THERE ANY APPARENT SIGNS OF INJURIES/BEHAVIORS THAT COULD BE RELATED TO ABUSE/NEGLECT?: NO

## 2024-08-04 SDOH — SOCIAL STABILITY: SOCIAL INSECURITY: HAVE YOU HAD ANY THOUGHTS OF HARMING ANYONE ELSE?: NO

## 2024-08-04 SDOH — HEALTH STABILITY: MENTAL HEALTH: NON-SPECIFIC ACTIVE SUICIDAL THOUGHTS (PAST 1 MONTH): NO

## 2024-08-04 SDOH — SOCIAL STABILITY: SOCIAL INSECURITY: ABUSE SCREEN: ADULT

## 2024-08-04 SDOH — SOCIAL STABILITY: SOCIAL INSECURITY: HAS ANYONE EVER THREATENED TO HURT YOUR FAMILY OR YOUR PETS?: NO

## 2024-08-04 SDOH — HEALTH STABILITY: MENTAL HEALTH: WERE YOU ABLE TO COMPLETE ALL THE BEHAVIORAL HEALTH SCREENINGS?: YES

## 2024-08-04 SDOH — SOCIAL STABILITY: SOCIAL INSECURITY: PHYSICAL ABUSE: DENIES

## 2024-08-04 SDOH — SOCIAL STABILITY: SOCIAL INSECURITY: ARE YOU OR HAVE YOU BEEN THREATENED OR ABUSED PHYSICALLY, EMOTIONALLY, OR SEXUALLY BY ANYONE?: NO

## 2024-08-04 SDOH — HEALTH STABILITY: MENTAL HEALTH: HAVE YOU USED ANY SUBSTANCES (CANABIS, COCAINE, HEROIN, HALLUCINOGENS, INHALANTS, ETC.) IN THE PAST 12 MONTHS?: NO

## 2024-08-04 SDOH — SOCIAL STABILITY: SOCIAL INSECURITY: DO YOU FEEL ANYONE HAS EXPLOITED OR TAKEN ADVANTAGE OF YOU FINANCIALLY OR OF YOUR PERSONAL PROPERTY?: NO

## 2024-08-04 SDOH — HEALTH STABILITY: MENTAL HEALTH: WISH TO BE DEAD (PAST 1 MONTH): NO

## 2024-08-04 SDOH — HEALTH STABILITY: MENTAL HEALTH: SUICIDAL BEHAVIOR (LIFETIME): NO

## 2024-08-04 SDOH — ECONOMIC STABILITY: HOUSING INSECURITY: DO YOU FEEL UNSAFE GOING BACK TO THE PLACE WHERE YOU ARE LIVING?: NO

## 2024-08-04 SDOH — SOCIAL STABILITY: SOCIAL INSECURITY: VERBAL ABUSE: DENIES

## 2024-08-04 SDOH — HEALTH STABILITY: MENTAL HEALTH: HAVE YOU USED ANY PRESCRIPTION DRUGS OTHER THAN PRESCRIBED IN THE PAST 12 MONTHS?: NO

## 2024-08-04 SDOH — SOCIAL STABILITY: SOCIAL INSECURITY: HAVE YOU HAD THOUGHTS OF HARMING ANYONE ELSE?: NO

## 2024-08-04 SDOH — SOCIAL STABILITY: SOCIAL INSECURITY: DOES ANYONE TRY TO KEEP YOU FROM HAVING/CONTACTING OTHER FRIENDS OR DOING THINGS OUTSIDE YOUR HOME?: NO

## 2024-08-04 ASSESSMENT — LIFESTYLE VARIABLES
AUDIT-C TOTAL SCORE: 0
AUDIT-C TOTAL SCORE: 0
SKIP TO QUESTIONS 9-10: 1
HOW MANY STANDARD DRINKS CONTAINING ALCOHOL DO YOU HAVE ON A TYPICAL DAY: PATIENT DOES NOT DRINK
HOW OFTEN DO YOU HAVE A DRINK CONTAINING ALCOHOL: NEVER
HOW OFTEN DO YOU HAVE 6 OR MORE DRINKS ON ONE OCCASION: NEVER

## 2024-08-04 ASSESSMENT — PAIN SCALES - GENERAL: PAINLEVEL_OUTOF10: 0 - NO PAIN

## 2024-08-04 ASSESSMENT — ACTIVITIES OF DAILY LIVING (ADL): LACK_OF_TRANSPORTATION: PATIENT DECLINED

## 2024-08-04 NOTE — H&P
Obstetrical Admission History and Physical     Monse White is a 28 y.o.  at 39w1d. NATACHA: 8/10/2024, by Last Menstrual Period. Estimated fetal weight: 8 pounds. She has had prenatal care with [facility] .    Chief Complaint: induction of labor    Assessment/Plan    -T&S, CBC  -PEC labs  -Cytotec oral then vaginal  -epidural as needed.    Active Problems:  There are no active Hospital Problems.      Pregnancy Problems (from 24 to present)       Problem Noted Resolved    Supervision of other normal pregnancy, antepartum ([facility]) 2024 by Moraima Kim MD No    Priority:  Medium      Late prenatal care affecting pregnancy in second trimester ([facility]) 3/5/2024 by LILLIANA Camacho-CNP No    Priority:  Medium      Flank pain 2024 by Farnaz Francis DO 7/15/2024 by Tabatha Cardozo MD    Priority:  Medium      Overview Signed 2024  3:00 AM by Farnaz Francis DO     Seen for back/pelvic pain and noted to have hematuria. Ucx pending 6/10, patient started empirically on Macrobid  Seen in triage 24 with worsening right flank pain   CBC, CMP, UAS, LR, Tylenol. If no improvement plan for renal US                 Subjective   29 yo  at 39.1 weeks, GBS negative, for induction of labor.    Pregnancy complicated by:  CHTN  BMI>40    Reason for Induction of Labor:  Chronic hypertension         Obstetrical History   OB History    Para Term  AB Living   5 3 3 0 1 3   SAB IAB Ectopic Multiple Live Births   1 0 0 0 3      # Outcome Date GA Lbr Larry/2nd Weight Sex Type Anes PTL Lv   5 Current            4 Term 23   3.771 kg M Vag-Spont EPI N DAVID      Complications: High blood pressure   3 Term 21 39w0d  3.856 kg M Vag-Spont EPI N    2 Term 12/10/19 39w0d  3.289 kg F Vag-Spont EPI N DAVID   1 SAB 2019               Past Medical History  No past medical history on file.     Past Surgical History   Past Surgical History:   Procedure Laterality Date     APPENDECTOMY  09/05/2013    Appendectomy       Social History  Social History     Tobacco Use    Smoking status: Never    Smokeless tobacco: Never   Substance Use Topics    Alcohol use: Not Currently     Substance and Sexual Activity   Drug Use Never       Allergies  Penicillins     Medications  (Not in a hospital admission)      Objective    Last Vitals  Temp Pulse Resp BP MAP O2 Sat                   Physical Examination  GENERAL: Examination reveals a well developed, well nourished, gravid female in no acute distress. She is alert and cooperative.  LUNGS: clear to auscultation bilaterally  HEART: regular rate and rhythm, S1, S2 normal, no murmur, click, rub or gallop  ABDOMEN: soft, gravid, nontender, nondistended, no abnormal masses, no epigastric pain  FHR is 150    The fetus is in a vertex presentation, determined by vaginal exam  CERVIX: cl  cm dilated,  50 % effaced, -4  station; MEMBRANES are intact.   EXTREMITIES: no redness or tenderness in the calves or thighs, no edema  PSYCHOLOGICAL: awake and alert; oriented to person, place, and time    Lab Review  Lab Results   Component Value Date    WBC 16.2 (H) 06/12/2024    HGB 11.9 (L) 06/12/2024    HCT 36.0 06/12/2024     06/12/2024

## 2024-08-05 ENCOUNTER — ANESTHESIA EVENT (OUTPATIENT)
Dept: OBSTETRICS AND GYNECOLOGY | Facility: HOSPITAL | Age: 29
End: 2024-08-05
Payer: COMMERCIAL

## 2024-08-05 ENCOUNTER — ANESTHESIA (OUTPATIENT)
Dept: OBSTETRICS AND GYNECOLOGY | Facility: HOSPITAL | Age: 29
End: 2024-08-05
Payer: COMMERCIAL

## 2024-08-05 LAB
CREAT UR-MCNC: 183.4 MG/DL (ref 20–320)
PROT UR-ACNC: 27 MG/DL (ref 5–24)
PROT/CREAT UR: 0.15 MG/MG CREAT (ref 0–0.17)
TREPONEMA PALLIDUM IGG+IGM AB [PRESENCE] IN SERUM OR PLASMA BY IMMUNOASSAY: NONREACTIVE

## 2024-08-05 PROCEDURE — 3E0P7VZ INTRODUCTION OF HORMONE INTO FEMALE REPRODUCTIVE, VIA NATURAL OR ARTIFICIAL OPENING: ICD-10-PCS | Performed by: OBSTETRICS & GYNECOLOGY

## 2024-08-05 PROCEDURE — 2500000004 HC RX 250 GENERAL PHARMACY W/ HCPCS (ALT 636 FOR OP/ED): Performed by: OBSTETRICS & GYNECOLOGY

## 2024-08-05 PROCEDURE — 0UQG7ZZ REPAIR VAGINA, VIA NATURAL OR ARTIFICIAL OPENING: ICD-10-PCS | Performed by: OBSTETRICS & GYNECOLOGY

## 2024-08-05 PROCEDURE — 10907ZC DRAINAGE OF AMNIOTIC FLUID, THERAPEUTIC FROM PRODUCTS OF CONCEPTION, VIA NATURAL OR ARTIFICIAL OPENING: ICD-10-PCS | Performed by: OBSTETRICS & GYNECOLOGY

## 2024-08-05 PROCEDURE — 3700000014 EPIDURAL BLOCK: Performed by: NURSE ANESTHETIST, CERTIFIED REGISTERED

## 2024-08-05 PROCEDURE — 86780 TREPONEMA PALLIDUM: CPT | Mod: GEALAB | Performed by: OBSTETRICS & GYNECOLOGY

## 2024-08-05 PROCEDURE — 2500000004 HC RX 250 GENERAL PHARMACY W/ HCPCS (ALT 636 FOR OP/ED): Performed by: NURSE ANESTHETIST, CERTIFIED REGISTERED

## 2024-08-05 PROCEDURE — 7100000016 HC LABOR RECOVERY PER HOUR

## 2024-08-05 PROCEDURE — 3E033VJ INTRODUCTION OF OTHER HORMONE INTO PERIPHERAL VEIN, PERCUTANEOUS APPROACH: ICD-10-PCS | Performed by: OBSTETRICS & GYNECOLOGY

## 2024-08-05 PROCEDURE — 59050 FETAL MONITOR W/REPORT: CPT

## 2024-08-05 PROCEDURE — 59409 OBSTETRICAL CARE: CPT | Performed by: OBSTETRICS & GYNECOLOGY

## 2024-08-05 PROCEDURE — 2500000001 HC RX 250 WO HCPCS SELF ADMINISTERED DRUGS (ALT 637 FOR MEDICARE OP): Performed by: OBSTETRICS & GYNECOLOGY

## 2024-08-05 PROCEDURE — 2720000007 HC OR 272 NO HCPCS

## 2024-08-05 PROCEDURE — 01967 NEURAXL LBR ANES VAG DLVR: CPT | Performed by: NURSE ANESTHETIST, CERTIFIED REGISTERED

## 2024-08-05 PROCEDURE — 51701 INSERT BLADDER CATHETER: CPT

## 2024-08-05 PROCEDURE — 7210000002 HC LABOR PER HOUR

## 2024-08-05 PROCEDURE — 1120000001 HC OB PRIVATE ROOM DAILY

## 2024-08-05 PROCEDURE — 82570 ASSAY OF URINE CREATININE: CPT | Performed by: OBSTETRICS & GYNECOLOGY

## 2024-08-05 PROCEDURE — 36415 COLL VENOUS BLD VENIPUNCTURE: CPT | Performed by: OBSTETRICS & GYNECOLOGY

## 2024-08-05 RX ORDER — MISOPROSTOL 200 UG/1
800 TABLET ORAL ONCE AS NEEDED
Status: DISCONTINUED | OUTPATIENT
Start: 2024-08-05 | End: 2024-08-05

## 2024-08-05 RX ORDER — MISOPROSTOL 200 UG/1
800 TABLET ORAL ONCE AS NEEDED
Status: DISCONTINUED | OUTPATIENT
Start: 2024-08-05 | End: 2024-08-07 | Stop reason: HOSPADM

## 2024-08-05 RX ORDER — DIPHENHYDRAMINE HCL 25 MG
25 CAPSULE ORAL EVERY 6 HOURS PRN
Status: DISCONTINUED | OUTPATIENT
Start: 2024-08-05 | End: 2024-08-07 | Stop reason: HOSPADM

## 2024-08-05 RX ORDER — NIFEDIPINE 10 MG/1
10 CAPSULE ORAL ONCE AS NEEDED
Status: DISCONTINUED | OUTPATIENT
Start: 2024-08-05 | End: 2024-08-07 | Stop reason: HOSPADM

## 2024-08-05 RX ORDER — ENOXAPARIN SODIUM 100 MG/ML
60 INJECTION SUBCUTANEOUS EVERY 24 HOURS
Status: DISCONTINUED | OUTPATIENT
Start: 2024-08-06 | End: 2024-08-07 | Stop reason: HOSPADM

## 2024-08-05 RX ORDER — ADHESIVE BANDAGE
10 BANDAGE TOPICAL
Status: DISCONTINUED | OUTPATIENT
Start: 2024-08-05 | End: 2024-08-07 | Stop reason: HOSPADM

## 2024-08-05 RX ORDER — HYDRALAZINE HYDROCHLORIDE 20 MG/ML
5 INJECTION INTRAMUSCULAR; INTRAVENOUS ONCE AS NEEDED
Status: DISCONTINUED | OUTPATIENT
Start: 2024-08-05 | End: 2024-08-07 | Stop reason: HOSPADM

## 2024-08-05 RX ORDER — OXYTOCIN/0.9 % SODIUM CHLORIDE 30/500 ML
60 PLASTIC BAG, INJECTION (ML) INTRAVENOUS ONCE AS NEEDED
Status: DISCONTINUED | OUTPATIENT
Start: 2024-08-05 | End: 2024-08-07 | Stop reason: HOSPADM

## 2024-08-05 RX ORDER — TRANEXAMIC ACID 100 MG/ML
1000 INJECTION, SOLUTION INTRAVENOUS ONCE AS NEEDED
Status: DISCONTINUED | OUTPATIENT
Start: 2024-08-05 | End: 2024-08-07 | Stop reason: HOSPADM

## 2024-08-05 RX ORDER — ONDANSETRON HYDROCHLORIDE 2 MG/ML
4 INJECTION, SOLUTION INTRAVENOUS EVERY 6 HOURS PRN
Status: DISCONTINUED | OUTPATIENT
Start: 2024-08-05 | End: 2024-08-07 | Stop reason: HOSPADM

## 2024-08-05 RX ORDER — OXYTOCIN 10 [USP'U]/ML
10 INJECTION, SOLUTION INTRAMUSCULAR; INTRAVENOUS ONCE AS NEEDED
Status: DISCONTINUED | OUTPATIENT
Start: 2024-08-05 | End: 2024-08-05

## 2024-08-05 RX ORDER — OXYTOCIN/0.9 % SODIUM CHLORIDE 30/500 ML
2-30 PLASTIC BAG, INJECTION (ML) INTRAVENOUS CONTINUOUS
Status: DISCONTINUED | OUTPATIENT
Start: 2024-08-05 | End: 2024-08-05

## 2024-08-05 RX ORDER — DIPHENHYDRAMINE HYDROCHLORIDE 50 MG/ML
25 INJECTION INTRAMUSCULAR; INTRAVENOUS EVERY 6 HOURS PRN
Status: DISCONTINUED | OUTPATIENT
Start: 2024-08-05 | End: 2024-08-07 | Stop reason: HOSPADM

## 2024-08-05 RX ORDER — LABETALOL HYDROCHLORIDE 5 MG/ML
20 INJECTION, SOLUTION INTRAVENOUS ONCE AS NEEDED
Status: DISCONTINUED | OUTPATIENT
Start: 2024-08-05 | End: 2024-08-07 | Stop reason: HOSPADM

## 2024-08-05 RX ORDER — BISACODYL 10 MG/1
10 SUPPOSITORY RECTAL DAILY PRN
Status: DISCONTINUED | OUTPATIENT
Start: 2024-08-05 | End: 2024-08-07 | Stop reason: HOSPADM

## 2024-08-05 RX ORDER — FENTANYL/ROPIVACAINE/NS/PF 2MCG/ML-.2
0-25 PLASTIC BAG, INJECTION (ML) INJECTION CONTINUOUS
Status: DISCONTINUED | OUTPATIENT
Start: 2024-08-05 | End: 2024-08-05

## 2024-08-05 RX ORDER — ACETAMINOPHEN 325 MG/1
975 TABLET ORAL EVERY 6 HOURS
Status: DISCONTINUED | OUTPATIENT
Start: 2024-08-05 | End: 2024-08-07 | Stop reason: HOSPADM

## 2024-08-05 RX ORDER — IBUPROFEN 600 MG/1
600 TABLET ORAL EVERY 6 HOURS
Status: DISCONTINUED | OUTPATIENT
Start: 2024-08-05 | End: 2024-08-07 | Stop reason: HOSPADM

## 2024-08-05 RX ORDER — OXYTOCIN/0.9 % SODIUM CHLORIDE 30/500 ML
60 PLASTIC BAG, INJECTION (ML) INTRAVENOUS ONCE AS NEEDED
Status: DISCONTINUED | OUTPATIENT
Start: 2024-08-05 | End: 2024-08-05

## 2024-08-05 RX ORDER — TRANEXAMIC ACID 100 MG/ML
1000 INJECTION, SOLUTION INTRAVENOUS ONCE AS NEEDED
Status: DISCONTINUED | OUTPATIENT
Start: 2024-08-05 | End: 2024-08-05

## 2024-08-05 RX ORDER — CARBOPROST TROMETHAMINE 250 UG/ML
250 INJECTION, SOLUTION INTRAMUSCULAR ONCE AS NEEDED
Status: DISCONTINUED | OUTPATIENT
Start: 2024-08-05 | End: 2024-08-05

## 2024-08-05 RX ORDER — LOPERAMIDE HYDROCHLORIDE 2 MG/1
4 CAPSULE ORAL EVERY 2 HOUR PRN
Status: DISCONTINUED | OUTPATIENT
Start: 2024-08-05 | End: 2024-08-07 | Stop reason: HOSPADM

## 2024-08-05 RX ORDER — SIMETHICONE 80 MG
80 TABLET,CHEWABLE ORAL 4 TIMES DAILY PRN
Status: DISCONTINUED | OUTPATIENT
Start: 2024-08-05 | End: 2024-08-07 | Stop reason: HOSPADM

## 2024-08-05 RX ORDER — METHYLERGONOVINE MALEATE 0.2 MG/ML
0.2 INJECTION INTRAVENOUS ONCE AS NEEDED
Status: DISCONTINUED | OUTPATIENT
Start: 2024-08-05 | End: 2024-08-05

## 2024-08-05 RX ORDER — POLYETHYLENE GLYCOL 3350 17 G/17G
17 POWDER, FOR SOLUTION ORAL 2 TIMES DAILY PRN
Status: DISCONTINUED | OUTPATIENT
Start: 2024-08-05 | End: 2024-08-07 | Stop reason: HOSPADM

## 2024-08-05 RX ORDER — CARBOPROST TROMETHAMINE 250 UG/ML
250 INJECTION, SOLUTION INTRAMUSCULAR ONCE AS NEEDED
Status: DISCONTINUED | OUTPATIENT
Start: 2024-08-05 | End: 2024-08-07 | Stop reason: HOSPADM

## 2024-08-05 RX ORDER — LOPERAMIDE HYDROCHLORIDE 2 MG/1
4 CAPSULE ORAL EVERY 2 HOUR PRN
Status: DISCONTINUED | OUTPATIENT
Start: 2024-08-05 | End: 2024-08-05

## 2024-08-05 RX ORDER — ONDANSETRON 4 MG/1
4 TABLET, FILM COATED ORAL EVERY 6 HOURS PRN
Status: DISCONTINUED | OUTPATIENT
Start: 2024-08-05 | End: 2024-08-07 | Stop reason: HOSPADM

## 2024-08-05 RX ORDER — OXYTOCIN 10 [USP'U]/ML
10 INJECTION, SOLUTION INTRAMUSCULAR; INTRAVENOUS ONCE AS NEEDED
Status: DISCONTINUED | OUTPATIENT
Start: 2024-08-05 | End: 2024-08-07 | Stop reason: HOSPADM

## 2024-08-05 RX ORDER — LIDOCAINE HYDROCHLORIDE 10 MG/ML
30 INJECTION INFILTRATION; PERINEURAL ONCE AS NEEDED
Status: DISCONTINUED | OUTPATIENT
Start: 2024-08-05 | End: 2024-08-05

## 2024-08-05 RX ORDER — LIDOCAINE 560 MG/1
1 PATCH PERCUTANEOUS; TOPICAL; TRANSDERMAL
Status: DISCONTINUED | OUTPATIENT
Start: 2024-08-05 | End: 2024-08-07 | Stop reason: HOSPADM

## 2024-08-05 RX ORDER — METHYLERGONOVINE MALEATE 0.2 MG/ML
0.2 INJECTION INTRAVENOUS ONCE AS NEEDED
Status: DISCONTINUED | OUTPATIENT
Start: 2024-08-05 | End: 2024-08-07 | Stop reason: HOSPADM

## 2024-08-05 RX ADMIN — MISOPROSTOL 25 MCG: 100 TABLET ORAL at 01:08

## 2024-08-05 RX ADMIN — MISOPROSTOL 25 MCG: 100 TABLET ORAL at 05:20

## 2024-08-05 RX ADMIN — SODIUM CHLORIDE, POTASSIUM CHLORIDE, SODIUM LACTATE AND CALCIUM CHLORIDE 125 ML/HR: 600; 310; 30; 20 INJECTION, SOLUTION INTRAVENOUS at 14:45

## 2024-08-05 RX ADMIN — SODIUM CHLORIDE, POTASSIUM CHLORIDE, SODIUM LACTATE AND CALCIUM CHLORIDE 125 ML/HR: 600; 310; 30; 20 INJECTION, SOLUTION INTRAVENOUS at 11:32

## 2024-08-05 RX ADMIN — MISOPROSTOL 25 MCG: 100 TABLET ORAL at 03:20

## 2024-08-05 RX ADMIN — MISOPROSTOL 25 MCG: 100 TABLET ORAL at 07:26

## 2024-08-05 RX ADMIN — Medication 2 MILLI-UNITS/MIN: at 11:30

## 2024-08-05 SDOH — HEALTH STABILITY: MENTAL HEALTH: CURRENT SMOKER: 0

## 2024-08-05 ASSESSMENT — PAIN SCALES - GENERAL
PAINLEVEL_OUTOF10: 0 - NO PAIN
PAINLEVEL_OUTOF10: 3
PAINLEVEL_OUTOF10: 0 - NO PAIN
PAINLEVEL_OUTOF10: 2
PAINLEVEL_OUTOF10: 0 - NO PAIN
PAINLEVEL_OUTOF10: 1
PAINLEVEL_OUTOF10: 0 - NO PAIN

## 2024-08-05 NOTE — PROGRESS NOTES
Intrapartum Progress Note    Subjective  Feels pressure.    Objective   Last Vitals:  /71 Temp 36.4 °C (97.5 °F) Pulse 106     Tracing: cat 1 tracing  Ctx: every 2-3 mins    VE: 9cm per nursing.    Tabatha Cardozo MD

## 2024-08-05 NOTE — H&P
Obstetrical Admission History and Physical    LATE ENTRY FROM ADMISSION     Monse White is a 28 y.o.  at 39w1d. NATACHA: 8/10/2024, by Last Menstrual Period. Estimated fetal weight: 8 pounds. She has had prenatal care with [facility] .    Chief Complaint: induction of labor    Assessment/Plan    -T&S, CBC  -PEC labs  -Cytotec oral then vaginal  -epidural as needed.    Principal Problem:    Chronic hypertension in pregnancy ([facility])      Pregnancy Problems (from 24 to present)       Problem Noted Resolved    Supervision of other normal pregnancy, antepartum ([facility]) 2024 by Moraima Kim MD No    Priority:  Medium      Late prenatal care affecting pregnancy in second trimester ([facility]) 3/5/2024 by LILLIANA Camacho-CNP No    Priority:  Medium      Flank pain 2024 by Farnaz Francis DO 7/15/2024 by Tabatha Cardozo MD    Priority:  Medium      Overview Signed 2024  3:00 AM by Farnaz Francis DO     Seen for back/pelvic pain and noted to have hematuria. Ucx pending 6/10, patient started empirically on Macrobid  Seen in triage 24 with worsening right flank pain   CBC, CMP, UAS, LR, Tylenol. If no improvement plan for renal US                 Subjective   29 yo  at 39.1 weeks, GBS negative, for induction of labor.    Pregnancy complicated by:  CHTN  BMI>40    Reason for Induction of Labor:  Chronic hypertension         Obstetrical History   OB History    Para Term  AB Living   5 3 3 0 1 3   SAB IAB Ectopic Multiple Live Births   1 0 0 0 3      # Outcome Date GA Lbr Larry/2nd Weight Sex Type Anes PTL Lv   5 Current            4 Term 23   3.771 kg M Vag-Spont EPI N DAVID      Complications: High blood pressure   3 Term 21 39w0d  3.856 kg M Vag-Spont EPI N    2 Term 12/10/19 39w0d  3.289 kg F Vag-Spont EPI N DAVID   1 SAB 2019               Past Medical History  No past medical history on file.     Past Surgical History   Past Surgical  History:   Procedure Laterality Date    APPENDECTOMY  09/05/2013    Appendectomy       Social History  Social History     Tobacco Use    Smoking status: Never    Smokeless tobacco: Never   Substance Use Topics    Alcohol use: Not Currently     Substance and Sexual Activity   Drug Use Never       Allergies  Penicillins     Medications  Medications Prior to Admission   Medication Sig Dispense Refill Last Dose    aspirin 81 mg capsule Take 162 mg by mouth.       L. acidophilus/Bifid. animalis 32 billion cell capsule Take by mouth.       ondansetron (Zofran) 4 mg tablet Take 1 tablet (4 mg) by mouth every 6 hours if needed for nausea or vomiting. 20 tablet 0     prenatal vit no.124/iron/folic (PRENATAL VITAMIN ORAL) Take by mouth.          Objective    Last Vitals  Temp Pulse Resp BP MAP O2 Sat   36.8 °C (98.2 °F) 84 18 128/72 92 99 %     Physical Examination  GENERAL: Examination reveals a well developed, well nourished, gravid female in no acute distress. She is alert and cooperative.  LUNGS: clear to auscultation bilaterally  HEART: regular rate and rhythm, S1, S2 normal, no murmur, click, rub or gallop  ABDOMEN: soft, gravid, nontender, nondistended, no abnormal masses, no epigastric pain  FHR is 150    The fetus is in a vertex presentation, determined by vaginal exam  CERVIX: Closed cm dilated,  50 % effaced, -4  station; MEMBRANES are intact.Intact  EXTREMITIES: no redness or tenderness in the calves or thighs, no edema  PSYCHOLOGICAL: awake and alert; oriented to person, place, and time    Lab Review  Lab Results   Component Value Date    WBC 10.6 08/04/2024    HGB 12.2 08/04/2024    HCT 37.2 08/04/2024     08/04/2024     Tabatha Cardozo MD

## 2024-08-05 NOTE — ANESTHESIA PROCEDURE NOTES
Epidural Block    Patient location during procedure: OB  Start time: 8/5/2024 2:50 PM  End time: 8/5/2024 3:01 PM  Reason for block: labor analgesia  Staffing  Performed: CRNA   Authorized by: FESTUS Rivero    Performed by: FESTUS Rivero    Preanesthetic Checklist  Completed: patient identified, IV checked, risks and benefits discussed, surgical consent, pre-op evaluation, timeout performed and sterile techniques followed  Block Timeout  RN/Licensed healthcare professional reads aloud to the Anesthesia provider and entire team: Patient identity, procedure with side and site, patient position, and as applicable the availability of implants/special equipment/special requirements.  Patient on coagulant treatment: no  Timeout performed at: 8/5/2024 2:50 PM  Block Placement  Patient position: sitting  Prep: ChloraPrep  Sterility prep: cap, drape, gloves, hand and mask  Sedation level: no sedation  Patient monitoring: heart rate, continuous pulse oximetry and blood pressure  Approach: midline  Local numbing: lidocaine 1% to skin and subcutaneous tissues  Vertebral space: lumbar  Lumbar location: L3-L4  Epidural  Loss of resistance technique: saline  Guidance: ultrasound guided        Needle  Needle gauge: 17  Needle length: 9 cm  Needle insertion depth: 9 cm  Catheter type: end hole  Catheter size: 19 G  Catheter at skin depth: 14 cm  Catheter securement method: clear occlusive dressing and liquid medical adhesive    Test dose: lidocaine 1.5% with epinephrine 1-to-200,000  Test dose: lidocaine 1.5% with epinephrine 1-to-200,000  Test dose result: no positive test dose    PCEA  Medication concentration used: 0.2% Ropivacaine with 2 mcg/mL Fentanyl  Dose (mL): 0  Lockout (minutes): 0  1-Hour Limit (boluses/hr): 0  Basal Rate: 10        Assessment  Sensory level: T10 bilateral  Block outcome: pain improved  Number of attempts: 1  Events: no positive test dose  Procedure assessment: patient tolerated  procedure well with no immediate complications

## 2024-08-05 NOTE — L&D DELIVERY NOTE
OB Delivery Note  2024  Monse White  28 y.o.   Vaginal, Spontaneous       Gestational Age: 39w2d  /Para:   Quantitative Blood Loss: Admission to Discharge: 0 mL (2024  9:00 PM - 2024  7:07 PM)    Maritza White [[id number]]      Labor Events    Rupture date/time: 2024 1428  Rupture type: Artificial  Fluid color: Clear  Fluid odor: None  Complications: None       Labor Event Times    Dilation complete date/time: 2024  Start pushing date/time: 2024       Placenta    Placenta delivery date/time: 2024  Placenta removal: Spontaneous  Placenta appearance: Intact  Placenta disposition: discarded       Cord    Vessels: 3 vessels  Complications: None  Delayed cord clamping?: Yes  Gases sent?: No  Stem cell collection (by provider): No       Lacerations    Episiotomy: None  Vaginal laceration?: Yes  Vaginal laceration repaired?: Yes  Repair suture: 3-0 Synthetic Suture       Anesthesia    Method: Epidural       Operative Delivery    Forceps attempted?: No  Vacuum extractor attempted?: No       Shoulder Dystocia    Shoulder dystocia present?: No        Delivery    Time head delivered: 2024 18:45:00  Birth date/time: 2024 18:45:00  Delivery type: Vaginal, Spontaneous  Complications: None       Apgars    Living status: Living  Apgar Component Scores:  1 min.:  5 min.:  10 min.:  15 min.:  20 min.:    Skin color:  1  1       Heart rate:  2  2       Reflex irritability:  2  2       Muscle tone:  2  2       Respiratory effort:  2  2       Total:  9  9       Apgars assigned by: TUNG AVILEZ       Delivery Providers    Delivering clinician: Tabatha Cardozo MD   Provider Role    Giovana Hall, RN Delivery Nurse    Praveena James, RN Nursery Nurse     Resident               The patient progressed to complete cervical dilation. The patient pushed for 3 contractions and delivered by . Following delivery the infant was placed on the mother's chest for  skin to skin care. Delayed cord clamping was done for 30 seconds, during which time the infant's nose and mouth were suctioned. The cord was clamped and cut. The placenta delivered spontaneously. A vaginal abrasion that was oozing was noted and repaired using 3.0 suture in the usual fashion. Hemostasis of the laceration was noted. The fundus was expressed and found to be firm and bekiw the umbilicus. Postpartum bleeding was found to be appropriate.         Tabatha Cardozo MD

## 2024-08-05 NOTE — PROGRESS NOTES
Category 1 tracing.  For induction of labor.  H&P reviewed.  Vaginal Cytotec placed after IV inserted    Cervix closed posterior

## 2024-08-05 NOTE — DISCHARGE INSTRUCTIONS
"    [facility]  [address]  [address], [place] [address]  [address], [place] [address]  Telephone: [phone] [place] or [phone] [place]    After Discharge Orders:  Please make an appointment for a BP check in office either 8/9/24 or 8/12/24      Medical equipment: Breast pump and home BP cuff     Call the Physician with any severe blood pressure elevation signs and symptoms:    Warning signs regarding BP:  BP level of 160/110 or higher  Severe headache or headache that does not resolve with over the counter pain medication  Chest pain with or without shortness of breath  Visual changes, particularly loss of visual field     BP monitoring:  Monitor your BP at home with your home cuff twice daily and record your readings  Make sure you have been at rest and without stress, recent caffeine/nicotine when you take your BP readings. Your arm that is in the BP cuff should be roughly at the level of the heart and your feet flat on the floor  Follow up in the office in 1-2 days for a BP check with one of our medical assistants or nurses    After your delivery - signs and symptoms to watch for:  Fever - Oral temperature greater than 100.4 degrees Fahrenheit  Foul-smelling vaginal discharge  Headache unrelieved by \"pain meds\"  Difficulty urinating  Breasts reddened, hard, hot to the touch  Nipple discharge which is foul-smelling or contains pus  Increased pain at the site of the laceration  Difficulty breathing with or without chest pain  New calf pain especially if only on one side  Sudden, continuing increased vaginal bleeding with or without clots  Unrelieved feelings of:  Inability to cope  Sadness  Anxiety  Lack of interest in baby  Insomnia  Crying     What to do at home:  See patient education handouts for full information  Resume activity gradually   Don't lift anything heavier than baby and carrier until OK'd by your Physician  No sex until OK'd by your Physician "   Take care of yourself by sleeping/resting as much as possible  Eat regular nutritious meals  Let someone else care for you, your baby, and housework as much as possible   Take pain medication as prescribed whenever you need them  Wear compression stockings if prescribed   To avoid/relieve constipation take stool softeners if advised   Drink lots of water/fruit juices  Increase fiber in your diet  Breast care: Wear support bra ; use lanolin ointment/cream, nipple shields, or cool compresses as needed     Refer to  Discharge Instructions for problems or follow-up regarding  nursing

## 2024-08-05 NOTE — PROGRESS NOTES
Late entry from approx 2:45h    Intrapartum Progress Note    Subjective   Feels contractions    Objective   Last Vitals:  /65 Temp 36.4 °C (97.5 °F) Pulse 86     Tracing: cat 1 tracing  Ctx: every 2-3 mins    VE: 3cm/60%/-2  AROM clear    Tabatha Cardozo MD

## 2024-08-05 NOTE — ANESTHESIA PREPROCEDURE EVALUATION
Patient: Monse White    Evaluation Method: In-person visit    Procedure Information    Date: 08/05/24  Procedure: Labor Analgesia         Relevant Problems   Anesthesia (within normal limits)      Cardiac   (+) Chronic hypertension in pregnancy ([facility])   (+) Pre-existing hypertension during pregnancy, antepartum ([facility])      Pulmonary (within normal limits)      Neuro (within normal limits)      GI (within normal limits)      /Renal (within normal limits)      Liver (within normal limits)      Endocrine   (+) Obesity in pregnancy ([facility])      Hematology (within normal limits)      ID (within normal limits)      Skin (within normal limits)      GYN   (+) Supervision of other normal pregnancy, antepartum ([facility])       Clinical information reviewed:    Allergies  Meds               NPO Detail:  No data recorded     OB/Gyn Evaluation    Present Pregnancy    Patient is pregnant now.   Obstetric History            Physical Exam    Airway  Mallampati: III  TM distance: >3 FB     Cardiovascular - normal exam     Dental - normal exam     Pulmonary - normal exam     Abdominal - normal exam         Anesthesia Plan    History of general anesthesia?: no  History of complications of general anesthesia?: no    ASA 3     epidural     The patient is not a current smoker.  Patient was not previously instructed to abstain from smoking on day of procedure.  Patient did not smoke on day of procedure.    Anesthetic plan and risks discussed with patient.  Use of blood products discussed with patient who consented to blood products.

## 2024-08-05 NOTE — PROGRESS NOTES
Intrapartum Progress Note    Subjective   Sleeping, comfortable  Received cytotec x 4 over night.    Objective   Last Vitals:  /72 Temp 36.8 °C (98.2 °F) Pulse 84     Tracing: cat 1 tracing  Ctx: irregular    VE: deferred.    IMP/PLAN  CHTN, Obesity  Term IOL  Cytotec  then pitocin.    Tabatha Cardozo MD

## 2024-08-05 NOTE — PROGRESS NOTES
Intrapartum Progress Note    Subjective   Feels occ contractions.    Objective   Last Vitals:  /72 Temp 36.4 °C (97.5 °F) Pulse 85     Tracing: cat 1 tracing  Ctx:ctx every 4-5 mins    VE: 2cm/60%/-2/vtx    Start pitocin    Tabatha Cardozo MD

## 2024-08-06 PROCEDURE — 1120000001 HC OB PRIVATE ROOM DAILY

## 2024-08-06 PROCEDURE — 2500000004 HC RX 250 GENERAL PHARMACY W/ HCPCS (ALT 636 FOR OP/ED): Performed by: OBSTETRICS & GYNECOLOGY

## 2024-08-06 PROCEDURE — 2500000001 HC RX 250 WO HCPCS SELF ADMINISTERED DRUGS (ALT 637 FOR MEDICARE OP): Performed by: OBSTETRICS & GYNECOLOGY

## 2024-08-06 RX ADMIN — IBUPROFEN 600 MG: 600 TABLET, FILM COATED ORAL at 00:24

## 2024-08-06 RX ADMIN — ENOXAPARIN SODIUM 60 MG: 60 INJECTION SUBCUTANEOUS at 20:59

## 2024-08-06 RX ADMIN — ACETAMINOPHEN 975 MG: 325 TABLET ORAL at 20:59

## 2024-08-06 RX ADMIN — IBUPROFEN 600 MG: 600 TABLET, FILM COATED ORAL at 07:04

## 2024-08-06 RX ADMIN — IBUPROFEN 600 MG: 600 TABLET, FILM COATED ORAL at 13:11

## 2024-08-06 RX ADMIN — ACETAMINOPHEN 975 MG: 325 TABLET ORAL at 07:04

## 2024-08-06 RX ADMIN — ACETAMINOPHEN 975 MG: 325 TABLET ORAL at 00:24

## 2024-08-06 RX ADMIN — IBUPROFEN 600 MG: 600 TABLET, FILM COATED ORAL at 20:59

## 2024-08-06 RX ADMIN — ACETAMINOPHEN 975 MG: 325 TABLET ORAL at 13:11

## 2024-08-06 ASSESSMENT — PAIN SCALES - GENERAL
PAINLEVEL_OUTOF10: 0 - NO PAIN
PAINLEVEL_OUTOF10: 1
PAIN_LEVEL: 4
PAINLEVEL_OUTOF10: 2
PAINLEVEL_OUTOF10: 4
PAINLEVEL_OUTOF10: 4

## 2024-08-06 ASSESSMENT — PAIN DESCRIPTION - LOCATION
LOCATION: ABDOMEN
LOCATION: ABDOMEN

## 2024-08-06 ASSESSMENT — PAIN DESCRIPTION - DESCRIPTORS: DESCRIPTORS: SORE

## 2024-08-06 NOTE — LACTATION NOTE
This note was copied from a baby's chart.  Lactation Consultant Note     [id number]   Lactation Consultation   Reason for Consult Initial assessment   Consultant Name GERMAINE Quijano RN, IBCLC   Maternal Information   Has mother  before? Yes   How long did the mother previously breastfeed?  first  for 9 months and continued pumping until  was 12 months old. Second child  for 6 months and third child  for 9 months.   Previous Maternal Breastfeeding Challenges None   Infant to breast within first 2 hours of birth? Yes   Exclusive Pump and Bottle Feed No   Maternal Assessment   Breast Assessment   (LC did not assess at this time.)   Nipple Assessment Intact  (per mother)   Infant Assessment   Infant Behavior Deep sleep  (Swaddled in bassinet.)   Infant Assessment   (39.2 weeks, approximately 16 HOL, 3 voids/2 stools since delivery)   Feeding Assessment   Nutrition Source Breastmilk   Feeding Method Nursing at the breast   Unable to assess infant feeding at this time   ( not due to feed at this time.)   Breast Pump   Pump   (Mother states she has a breast pump for home use.)   Patient Follow-Up   Inpatient Lactation Follow-up Needed    ( encouraged mother to call to have a latch assessed.)   Other OB Lactation Documentation    Maternal Risk Factors Hypertension   Infant Risk Factors High birth weight >3600 g       Recommendations/Summary  27 y/o  experienced breastfeeding mother with vaginal delivery of full term  girl approximately 16 hours ago. Mothers's history notable for CHTN. Mother states she plans to breastfeed this  for as long as possible. Mother states she breast fed her first child for 9 months and pumped until  was 12 months and that she breast fed her second and third children for 6 months and 9 months respectively. Mother denies any difficulties with breastfeeding her other children. Mother denies breast changes during  pregnancy and denies history of breast surgery. Mother states she has a pump at home and is a stay at home mom.     LC to bedside to assess mother's breastfeeding goals and review education. Mother states  has been nursing very well thus far, feeding approximately every 3 hours. Mother states the latch feels comfortable and declines any pain or breakdown. Mother states she does have a history of oversupply when first starting breastfeeding and then her supply levels out. Widen content and sleeping at this time. LC encouraged mother to call LC if she wishes to have a latch assessed. Mother states understanding.     Education reviewed at this time. Reviewed milk production, normal  feeding patterns in the first 24 hours and 's stomach capacity. Reviewed feeding cues, waking techniques and signs to know  is eating enough. Reviewed signs of a deep and comfortable latch and adequate output. Reviewed frequency of feeds and importance of feeding  every 3 hours from the beginning of the previous feed or earlier with feeding cues. Discussed importance of skin to skin. Mother states understanding. Offered ongoing assistance with breastfeeding. Mother denies further questions or concerns at this time.

## 2024-08-06 NOTE — PROGRESS NOTES
Postpartum Progress Note    Assessment/Plan   Monse White is a 28 y.o., , who delivered at 39w2d gestation and is now postpartum day 1.  -PPD # 1 s/p   -work with lactation.  -CHTN-mild BP this morning. Labs normal yesterday. Will continue to monitor.  -Likely discharge tomorrow.    Principal Problem:    Chronic hypertension in pregnancy ([facility])    Pregnancy Problems (from 24 to present)       Problem Noted Resolved    Chronic hypertension in pregnancy ([facility]) 2024 by Cecy Michele MD No    Priority:  Medium      Supervision of other normal pregnancy, antepartum ([facility]) 2024 by Moraima Kim MD No    Priority:  Medium      Late prenatal care affecting pregnancy in second trimester ([facility]) 3/5/2024 by Kelsea Zacarias APRN-CNP No    Priority:  Medium      Flank pain 2024 by Farnaz Francis DO 7/15/2024 by Tabatha Cardozo MD    Priority:  Medium      Overview Signed 2024  3:00 AM by Farnaz Francis DO     Seen for back/pelvic pain and noted to have hematuria. Ucx pending 6/10, patient started empirically on Macrobid  Seen in triage 24 with worsening right flank pain   CBC, CMP, UAS, LR, Tylenol. If no improvement plan for renal US               Hospital course: chronic hypertension  Vaginal Birth  Patient is currently breastfeedingThe patient's blood type is O POS. The baby's blood type is O POS. Rhogam is not indicated.    Subjective   Patient without complaints. Breast feeding. Denies headache, etc.    Objective   Allergies:   Penicillins         Last Vitals:  Temp Pulse Resp BP MAP Pulse Ox   36.7 °C (98.1 °F) 91 18 (!) 152/84   (!) 90 %     Vitals Min/Max Last 24 Hours:  Temp  Min: 36.4 °C (97.5 °F)  Max: 36.9 °C (98.4 °F)  Pulse  Min: 82  Max: 134  Resp  Min: 16  Max: 18  BP  Min: 115/59  Max: 175/77    Intake/Output:     Intake/Output Summary (Last 24 hours) at 2024 0823  Last data filed at 2024 2115  Gross per 24 hour   Intake  1000 ml   Output 580 ml   Net 420 ml       Physical Exam:  General: Examination reveals a well developed, well nourished, female, in no acute distress. She is alert and cooperative.  Psychological: awake and alert; oriented to person, place, and time.  Abdomen: soft, non tender, non distended. Fundus firm, non tender, below umbilicus.  Ext: non tender, 1+ edema.    Lab Data:  Lab Results   Component Value Date    WBC 10.6 08/04/2024    HGB 12.2 08/04/2024    HCT 37.2 08/04/2024     08/04/2024

## 2024-08-06 NOTE — LACTATION NOTE
This note was copied from a baby's chart.  Lactation Consultant Note  Lactation Consultation  Reason for Consult: Follow-up assessment  Consultant Name: GERMAINE Quijano RN, IBCLC    Maternal Information       Maternal Assessment  Breast Assessment: Large, Pendulous, Soft, Warm, Compressible  Nipple Assessment:  (Could not assess,  currently latched.)    Infant Assessment  Infant Behavior: Sucking    Feeding Assessment  Nutrition Source: Breastmilk  Feeding Method: Nursing at the breast  Feeding Position: Cradle, Cross - cradle, Skin to skin, Mother demonstrates good positioning (Mother using breastfeeding pillow to support  to the breast.)  Suck/Feeding: Sustained, Coordinated suck/swallow/breathe, Baby led rhythmically  Latch Assessment: Optimal angle of mouth opening, Comfortable with no pain, Sucking and swallowing, Sucks with long jaw movement, Bursts of sucking, swallowing, and rest, Flanged lips, Comfortable latch, Chin moves in rhythmic motion    LATCH TOOL  Latch: Grasps breast, tongue down, lips flanged, rhythmic sucking  Audible Swallowing: A few with stimulation  Comfort (Breast/Nipple): Soft/non-tender  Hold (Positioning): No assist from staff, mother able to position/hold infant  LATCH Score: 7    Breast Pump       Other OB Lactation Tools       Patient Follow-up  Inpatient Lactation Follow-up Needed :  (as needed)    Other OB Lactation Documentation       Recommendations/Summary  LC to bedside to assess breastfeeding progress. Mother currently nursing . [place] latched to left breast. Mother using breastfeeding pillow to support  to the breast. Deep latch observed with active sucking and swallowing, lips flanged and long jaw movements. Mother states latch is comfortable. LC reviewed signs of a deep and comfortable latch and encouraged mother to tuck  close and provide breast shaping if she feels that  is becoming shallow on the breast. Mother states understanding.  Offered ongoing assistance with breastfeeding. Mother denies further questions or concerns at this time.

## 2024-08-07 VITALS
HEIGHT: 64 IN | OXYGEN SATURATION: 96 % | WEIGHT: 285.61 LBS | SYSTOLIC BLOOD PRESSURE: 142 MMHG | BODY MASS INDEX: 48.76 KG/M2 | DIASTOLIC BLOOD PRESSURE: 96 MMHG | TEMPERATURE: 98.4 F | HEART RATE: 76 BPM | RESPIRATION RATE: 18 BRPM

## 2024-08-07 PROBLEM — O10.919: Status: RESOLVED | Noted: 2024-02-21 | Resolved: 2024-08-07

## 2024-08-07 PROBLEM — Z34.80 SUPERVISION OF OTHER NORMAL PREGNANCY, ANTEPARTUM (HHS-HCC): Status: RESOLVED | Noted: 2024-05-23 | Resolved: 2024-08-07

## 2024-08-07 PROBLEM — O09.32 LATE PRENATAL CARE AFFECTING PREGNANCY IN SECOND TRIMESTER (HHS-HCC): Status: RESOLVED | Noted: 2024-03-05 | Resolved: 2024-08-07

## 2024-08-07 PROBLEM — O99.210 OBESITY IN PREGNANCY (HHS-HCC): Status: RESOLVED | Noted: 2024-02-21 | Resolved: 2024-08-07

## 2024-08-07 PROCEDURE — 2500000001 HC RX 250 WO HCPCS SELF ADMINISTERED DRUGS (ALT 637 FOR MEDICARE OP): Performed by: OBSTETRICS & GYNECOLOGY

## 2024-08-07 RX ORDER — IBUPROFEN 600 MG/1
600 TABLET ORAL EVERY 6 HOURS
COMMUNITY
Start: 2024-08-07

## 2024-08-07 RX ORDER — ACETAMINOPHEN 325 MG/1
975 TABLET ORAL EVERY 6 HOURS
COMMUNITY
Start: 2024-08-07

## 2024-08-07 RX ORDER — ACETAMINOPHEN 500 MG
1 TABLET ORAL 2 TIMES DAILY
Qty: 1 EACH | Refills: 0 | Status: SHIPPED | OUTPATIENT
Start: 2024-08-07

## 2024-08-07 RX ADMIN — ACETAMINOPHEN 975 MG: 325 TABLET ORAL at 03:02

## 2024-08-07 RX ADMIN — IBUPROFEN 600 MG: 600 TABLET, FILM COATED ORAL at 03:02

## 2024-08-07 ASSESSMENT — PAIN SCALES - GENERAL: PAINLEVEL_OUTOF10: 0 - NO PAIN

## 2024-08-07 NOTE — LACTATION NOTE
Lactation Consultant Note  Lactation Consultation       Maternal Information       Maternal Assessment       Infant Assessment       Feeding Assessment       LATCH TOOL       Breast Pump       Other OB Lactation Tools       Patient Follow-up       Other OB Lactation Documentation       Recommendations/Summary   experienced breastfeeding mother and infant. Mother states that infant has been feeding frequently and mother is unsure of how much infant is getting. Mother would like to pump to get milk for infant. LC reviewed that with pumping during colostrum phase, the pump will most likely not yield much colostrum. Reviewed how infant removes milk better than the pump and reviewed how to know infant is getting enough. Cluster feeding reviewed at this time also. Infant came off breast at this time and got fussy. Mother re-latching infant. Infant latched to breast, but not very deeply. LC reviewed with mother what a deep and comfortable latch should look and feel like. Reviewed breast shaping and making a breast sandwich for infant. Reviewed putting slight pressure on tops of infant's shoulders to help keep bottom lip flanged into breast and to help baby's nose to come off breast slightly.  Infant nursing well at this time.   Manual pump given to mother at this time. Mother has an electric breast pump at home, but does not currently have power at her home. Manual pump reviewed and cleaning of pump reviewed also.   Discharge education reviewed at this time. Breastfeeding Step by Step handout given and reviewed. Mother denies any questions or concerns at this time. Outpatient resources reviewed.     Offered ongoing assistance with breastfeeding.

## 2024-08-07 NOTE — PROGRESS NOTES
Postpartum Progress Note    Assessment/Plan   Monse White is a 28 y.o., , who delivered at 39w2d gestation and is now postpartum day 2.    Patient doing well  Continue routine care  Ibuprofen/Tylenol for pain management  BP normal to mild range, patient asymptomatic  Regular diet  Encouraged ambulation  SCD/Lovenox for DVT prophylaxis      Principal Problem:    Chronic hypertension in pregnancy ([facility])    Pregnancy Problems (from 24 to present)       Problem Noted Resolved    Chronic hypertension in pregnancy ([facility]) 2024 by Cecy Michele MD No    Priority:  Medium      Supervision of other normal pregnancy, antepartum ([facility]) 2024 by Moraima Kim MD No    Priority:  Medium      Late prenatal care affecting pregnancy in second trimester ([facility]) 3/5/2024 by Kelsea Zacarias APRN-CNP No    Priority:  Medium      Flank pain 2024 by Farnaz Francis DO 7/15/2024 by Tabatha Cardozo MD    Priority:  Medium      Overview Signed 2024  3:00 AM by Farnaz Francis DO     Seen for back/pelvic pain and noted to have hematuria. Ucx pending 6/10, patient started empirically on Macrobid  Seen in triage 24 with worsening right flank pain   CBC, CMP, UAS, LR, Tylenol. If no improvement plan for renal US               Hospital course: chronic hypertension  Vaginal Birth  Patient is currently breastfeedingThe patient's blood type is O POS. The baby's blood type is O POS. Rhogam is not indicated.    Subjective   Her pain is well controlled with current medications  She is passing flatus  She is ambulating well  She is tolerating a Adult diet Regular  She reports no breast or nursing problems  She denies emotional concerns today   Her plan for contraception will be discussed at postpartum visit     Patient denies HA, change in vision, CP/SOB.    Objective   Allergies:   Penicillins         Last Vitals:  Temp Pulse Resp BP MAP Pulse Ox   36.9 °C (98.4 °F) 76 18 (!)  142/96 115 96 %     Vitals Min/Max Last 24 Hours:  Temp  Min: 36.7 °C (98.1 °F)  Max: 36.9 °C (98.4 °F)  Pulse  Min: 75  Max: 96  Resp  Min: 18  Max: 18  BP  Min: 112/56  Max: 142/96  MAP (mmHg)  Min: 77  Max: 115    Intake/Output:   No intake or output data in the 24 hours ending 08/07/24 0839    Physical Exam:  General: Examination reveals a well developed, well nourished, female, in no acute distress. She is alert and cooperative.  Lungs: appropriate effort.  Fundus: firm, below umbilicus, and nontender.  Psychological: awake and alert; oriented to person, place, and time.    Lab Data:  Lab Results   Component Value Date    WBC 10.6 08/04/2024    HGB 12.2 08/04/2024    HCT 37.2 08/04/2024     08/04/2024   Farnaz Francis, DO

## 2024-08-07 NOTE — DISCHARGE SUMMARY
Discharge Summary    Admission Date: 2024  Discharge Date: 24    Discharge Diagnosis  Vaginal delivery ([facility])    Hospital Course  Delivery Date: 2024 6:45 PM  Delivery type: Vaginal, Spontaneous   GA at delivery: 39w2d  Outcome: Living  Anesthesia during delivery: Epidural  Intrapartum complications: None  Feeding method: Breastfeeding Status: Yes     Procedures:    Contraception at discharge: will be discussed at postpartum visit      Pertinent Physical Exam At Time of Discharge    General: Examination reveals a well developed, well nourished, female, in no acute distress. She is alert and cooperative.  Lungs: appropriate effort.  Fundus: firm, below umbilicus, and nontender.  Psychological: awake and alert; oriented to person, place, and time.    Last Vitals:  Temp Pulse Resp BP MAP Pulse Ox   36.9 °C (98.4 °F) 76 18 (!) 142/96 115 96 %     Discharge Meds     Your medication list        START taking these medications        Instructions Last Dose Given Next Dose Due   acetaminophen 325 mg tablet  Commonly known as: Tylenol      Take 3 tablets (975 mg) by mouth every 6 hours.       blood pressure test kit-large kit      1 Units 2 times a day.       ibuprofen 600 mg tablet      Take 1 tablet (600 mg) by mouth every 6 hours.              CONTINUE taking these medications        Instructions Last Dose Given Next Dose Due   L. acidophilus/Bifid. animalis 32 billion cell capsule           PRENATAL VITAMIN ORAL                  STOP taking these medications      aspirin 81 mg capsule        ondansetron 4 mg tablet  Commonly known as: Zofran                  Where to Get Your Medications        These medications were sent to [facility] [address] [place] [address] [address] [facility]  [address] [place] [id number]      Phone: [phone]   blood pressure test kit-large kit       You can get these medications from any pharmacy    You don't need a prescription for these  medications  acetaminophen 325 mg tablet  ibuprofen 600 mg tablet          Complications Requiring Follow-Up  Chronic hypertension    Test Results Pending At Discharge  Pending Labs       No current pending labs.            Outpatient Follow-Up  RTO 2 days    I spent 30 minutes in the professional and overall care of this patient.      Farnaz Francis, DO

## 2024-08-12 ENCOUNTER — TELEPHONE (OUTPATIENT)
Dept: OBSTETRICS AND GYNECOLOGY | Facility: CLINIC | Age: 29
End: 2024-08-12

## 2024-08-12 ENCOUNTER — APPOINTMENT (OUTPATIENT)
Dept: OBSTETRICS AND GYNECOLOGY | Facility: CLINIC | Age: 29
End: 2024-08-12
Payer: COMMERCIAL

## 2024-10-01 ENCOUNTER — APPOINTMENT (OUTPATIENT)
Dept: OBSTETRICS AND GYNECOLOGY | Facility: CLINIC | Age: 29
End: 2024-10-01
Payer: COMMERCIAL

## 2024-10-01 VITALS — BODY MASS INDEX: 44.8 KG/M2 | WEIGHT: 261 LBS | SYSTOLIC BLOOD PRESSURE: 118 MMHG | DIASTOLIC BLOOD PRESSURE: 64 MMHG

## 2024-10-01 DIAGNOSIS — Z12.4 CERVICAL CANCER SCREENING: ICD-10-CM

## 2024-10-01 PROBLEM — O10.919 CHRONIC HYPERTENSION IN PREGNANCY (HHS-HCC): Status: RESOLVED | Noted: 2024-08-04 | Resolved: 2024-10-01

## 2024-10-01 PROCEDURE — 87624 HPV HI-RISK TYP POOLED RSLT: CPT

## 2024-10-01 PROCEDURE — 0503F POSTPARTUM CARE VISIT: CPT | Performed by: OBSTETRICS & GYNECOLOGY

## 2024-10-01 PROCEDURE — 88175 CYTOPATH C/V AUTO FLUID REDO: CPT

## 2024-10-01 ASSESSMENT — EDINBURGH POSTNATAL DEPRESSION SCALE (EPDS)
I HAVE BEEN SO UNHAPPY THAT I HAVE BEEN CRYING: NO, NEVER
I HAVE BLAMED MYSELF UNNECESSARILY WHEN THINGS WENT WRONG: NOT VERY OFTEN
I HAVE FELT SAD OR MISERABLE: NO, NOT AT ALL
TOTAL SCORE: 1
I HAVE FELT SCARED OR PANICKY FOR NO GOOD REASON: NO, NOT AT ALL
I HAVE BEEN ABLE TO LAUGH AND SEE THE FUNNY SIDE OF THINGS: AS MUCH AS I ALWAYS COULD
I HAVE BEEN SO UNHAPPY THAT I HAVE HAD DIFFICULTY SLEEPING: NOT AT ALL
I HAVE BEEN ANXIOUS OR WORRIED FOR NO GOOD REASON: NO, NOT AT ALL
I HAVE LOOKED FORWARD WITH ENJOYMENT TO THINGS: AS MUCH AS I EVER DID
THE THOUGHT OF HARMING MYSELF HAS OCCURRED TO ME: NEVER
THINGS HAVE BEEN GETTING ON TOP OF ME: NO, I HAVE BEEN COPING AS WELL AS EVER

## 2024-10-01 NOTE — PATIENT INSTRUCTIONS
Postpartum Visit:  You were seen in office today for your postpartum visit with normal findings  At this time you have fully healed from delivery and are able to resume all activities including sexual intercourse  If you are nursing you may not have resumption of periods until you are done nursing. If you are formula feeding your periods should resume in the next few months.  If you are nursing, continue to monitor for signs of mastitis: fever, chills, flu like symptoms, swelling and tenderness of the breast not relieved by nursing/pumping, redness/warmth of the breast. Make sure you keep the breast empty by nursing or pumping. The milk is not infected and can be given to the infant.  You may resume intercourse at this time. You should use condoms until you have effective contraception in place.   Your depression assessment was notable for low risk for postpartum depression, continue to monitor for symptoms of depression and call the office if you are struggling  You were not due for a pap smear at this time, you should be seen in the office in 1 year for an annual gynecologic visit.   If you are having any problems in the next year, please call the office to let us know. [phone] ([place]) or [phone] ([place])

## 2024-10-01 NOTE — PROGRESS NOTES
Postpartum Visit    IMP/PLAN  Normal postpartum exam  May resume all previous activities.    A pap and HPV test was done. If you are signed onto the  MY CHART, you will be notified about your pap results through the MY CHART in 2-3 weeks.        Subjective   Monse White is in for her postpartum visit.   She delivered 2024 , baby girl.   She is generally doing well, denies current pain or bleeding issues, and has no significant depression issues.   She is breast feeding. No breast concerns.  Bleeding stopped, no menses.   No bowel or bladder problems.   plans vasectomy. Already had consultation visit.     Objective   /64   Wt 118 kg (261 lb)   LMP 2023 (Exact Date)   Breastfeeding Yes   BMI 44.80 kg/m²   Body mass index is 44.8 kg/m².    Physical Exam:     Constitutional: Alert and in no acute distress.     Pulmonary: No respiratory distress. Clear to auscultation.     CV: regular rate and rhythm.     Chest: Breasts: Normal appearance, no skin changes. Palpation of breasts and axillae: No palpable mass and no axillary lymphadenopathy.    Abdomen: Soft, non-tender.     Genitourinary:   External genitalia: Normal appearance.  No inguinal lymphadenopathy.   Urethra: Normal. Bladder: Normal on palpation.   Vagina: Normal.   Cervix: Normal appearance, nontender.   Uterus/Adnexa: Normal size, mobile uterus. Nontender, no masses palpated in adnexa  Inspection of perianal area: Normal.    Musculoskeletal: No joint swelling seen, normal movements of all extremities.    Skin: Normal skin color and pigmentation, normal skin turgor, and no rash.    Psychiatric: Alert and oriented x 3. Affect normal to patient's baseline. Mood: Appropriate.     Tabatha Cardozo MD

## 2025-01-11 ENCOUNTER — HOSPITAL ENCOUNTER (EMERGENCY)
Facility: HOSPITAL | Age: 30
Discharge: HOME | End: 2025-01-12
Payer: COMMERCIAL

## 2025-01-11 ENCOUNTER — APPOINTMENT (OUTPATIENT)
Dept: RADIOLOGY | Facility: HOSPITAL | Age: 30
End: 2025-01-11
Payer: COMMERCIAL

## 2025-01-11 DIAGNOSIS — K42.9 UMBILICAL HERNIA WITHOUT OBSTRUCTION AND WITHOUT GANGRENE: Primary | ICD-10-CM

## 2025-01-11 PROCEDURE — 74176 CT ABD & PELVIS W/O CONTRAST: CPT

## 2025-01-11 PROCEDURE — 99284 EMERGENCY DEPT VISIT MOD MDM: CPT | Mod: 25

## 2025-01-11 PROCEDURE — 74176 CT ABD & PELVIS W/O CONTRAST: CPT | Mod: FOREIGN READ | Performed by: RADIOLOGY

## 2025-01-11 ASSESSMENT — PAIN SCALES - GENERAL: PAINLEVEL_OUTOF10: 5 - MODERATE PAIN

## 2025-01-11 ASSESSMENT — PAIN DESCRIPTION - LOCATION: LOCATION: ABDOMEN

## 2025-01-11 ASSESSMENT — COLUMBIA-SUICIDE SEVERITY RATING SCALE - C-SSRS
1. IN THE PAST MONTH, HAVE YOU WISHED YOU WERE DEAD OR WISHED YOU COULD GO TO SLEEP AND NOT WAKE UP?: NO
6. HAVE YOU EVER DONE ANYTHING, STARTED TO DO ANYTHING, OR PREPARED TO DO ANYTHING TO END YOUR LIFE?: NO
2. HAVE YOU ACTUALLY HAD ANY THOUGHTS OF KILLING YOURSELF?: NO

## 2025-01-11 ASSESSMENT — PAIN - FUNCTIONAL ASSESSMENT: PAIN_FUNCTIONAL_ASSESSMENT: 0-10

## 2025-01-12 VITALS
DIASTOLIC BLOOD PRESSURE: 88 MMHG | OXYGEN SATURATION: 99 % | RESPIRATION RATE: 18 BRPM | TEMPERATURE: 97.3 F | HEART RATE: 87 BPM | HEIGHT: 63 IN | SYSTOLIC BLOOD PRESSURE: 122 MMHG | WEIGHT: 250 LBS | BODY MASS INDEX: 44.3 KG/M2

## 2025-01-12 NOTE — ED PROVIDER NOTES
Limitations to history: None  Independent Historians: Family  External Records Reviewed: HIE, OARRS, outpatient notes, inpatient notes, paper charts if needed    History of Present Illness:  Patient is a 29-year-old female presents to ED chief complaint of a possible umbilical hernia.  Patient reports she has noticed some swelling near her bellybutton for a while now, reports that she bent over yesterday and now has increased pain near her umbilical region.  Patient reports she is currently breast-feeding.  Patient denied any other systemic symptoms of fevers, chills, nausea, vomiting, diarrhea.  Upon examination patient is alert and oriented x 3, in no acute distress.      Denies HA, C/P, SOB, ABD pain, Nausea, Vomiting, Diarrhea, Weakness, Dizziness, Fever, Chills.    PMFSH:   As per HPI, otherwise nurses notes reviewed in EMR    Physical Exam:  Appearance: Alert, oriented x3, supine on exam table with head elevated, cooperative, in no acute distress. Well nourished & well hydrated.      Skin: Intact, dry skin, no lesions, rash, petechiae or purpura.     Eyes: PERRLA, EOMs intact, Conjunctiva pink with no redness or exudates. No scleral icterus.     Ears: Hearing grossly intact.      Nose: Nares patent, no epistaxis.     Mouth: Dentition without concerning abnormalities. no obstruction of posterior pharynx.     Neck: Supple, without meningismus. Trachea at midline.     Pulmonary: Clear bilaterally with good chest wall excursion. No rales, rhonchi or wheezing. No accessory muscle use or stridor. Talking in full sentences.     Cardiac: Normal S1, S2 without murmur, rub, gallop or extrasystole.     Abdomen: Soft, nontender to light and deep palpation to all quadrants, normoactive bowel sounds.  No palpable organomegaly.  No rebound or guarding.     Genitourinary: Physical exam deferred.     Musculoskeletal: Normal gait. Full range of motion to all extremities. Rest of the exam reveals no pain on palpation,  "instability, or deformity. Pulses full and equal. No cyanosis or clubbing. capillary refill <2 seconds to all examined digits.     Neurological:  Cranial nerves II through XII are grossly intact, normal sensation, no weakness, no focal findings identified.      Psychiatric: Appropriate mood and affect.    Labs Reviewed - No data to display   CT abdomen pelvis wo IV contrast   Final Result   Fat-containing umbilical hernia defect with a 5.6 x 2 cm hernia sac   and a 1.2 x 1.5 cm defect.   7 cm right ovarian cyst is statistically likely benign and can be   followed with ultrasound if desired.   Signed by Rober Alexander MD                     Repeat Evaluation below    Summary:  Medical Decision Making:   Patient presented as described in HPI. Patient case including ROS, PE, and treatment and plan discussed with ED attending if attached as cosigner. Due to patients presentation orders completed include as documented.  Patient evaluated for complaints of a possible umbilical hernia.  Patient was found to be afebrile, nontachycardic, nonhypoxic.  CT imaging revealed fat-containing umbilical hernia.  Patient remained stable condition while in ED.  Patient denies any other systemic complaints of fevers, chills, nausea, vomiting, diarrhea.  Patient will be referred to general surgery for treatment of umbilical hernia.  Patient aware that to return to ED if she develops worsening pain, if she notices umbilical protrusion.  While in ED, patient reports that she felt her hernia \"go back\".  Patient denies any other further pain or any other symptoms at this time.  Patient was advised to follow up with PCP or recommended provider in 2-3 days for another evaluation and exam. I advised patient/guardian to return or go to closest emergency room immediately if symptoms change, get worse, new symptoms develop prior to follow up. If there is no improvement in symptoms in the next 24 hours they are advised to return for further " evaluation and exam. I also explained the plan and treatment course. Patient/guardian is in agreement with plan, treatment course, and follow up and states verbally that they will comply.    Tests/Medications/Escalations of Care considered but not given:    Patient care discussed with: N/A  Social Determinants affecting care: N/A    Final diagnosis and disposition as documented in impression    Homegoing. I discussed the differential; results and discharge plan with the patient and/or family/friend/caregiver if present.  I emphasized the importance of follow-up with the physician I referred them to in the timeframe recommended.  I explained reasons for the patient to return to the Emergency Department. They agreed that if they feel their condition is worsening or if they have any other concern they should call 911 immediately for further assistance. I gave the patient an opportunity to ask all questions they had and answered all of them accordingly. They understand return precautions and discharge instructions. The patient and/or family/friend/caregiver expressed understanding verbally and that they would comply.       Disposition:  Discharge         This note has been transcribed using voice recognition and may contain grammatical errors, misplaced words, incorrect words, incorrect phrases or other errors.     Monse De, LILLIANA-YARY  01/12/25 0008

## 2025-02-10 ENCOUNTER — APPOINTMENT (OUTPATIENT)
Dept: SURGERY | Facility: CLINIC | Age: 30
End: 2025-02-10
Payer: COMMERCIAL

## 2025-02-10 VITALS
BODY MASS INDEX: 46.14 KG/M2 | WEIGHT: 260.4 LBS | HEIGHT: 63 IN | HEART RATE: 84 BPM | OXYGEN SATURATION: 95 % | TEMPERATURE: 97.8 F | SYSTOLIC BLOOD PRESSURE: 128 MMHG | DIASTOLIC BLOOD PRESSURE: 86 MMHG

## 2025-02-10 DIAGNOSIS — K42.9 UMBILICAL HERNIA WITHOUT OBSTRUCTION AND WITHOUT GANGRENE: ICD-10-CM

## 2025-02-10 PROCEDURE — 3008F BODY MASS INDEX DOCD: CPT | Performed by: SURGERY

## 2025-02-10 PROCEDURE — 99203 OFFICE O/P NEW LOW 30 MIN: CPT | Performed by: SURGERY

## 2025-02-10 NOTE — PROGRESS NOTES
Subjective   Patient ID: Monse White is a 29 y.o. female who presents for New Patient Visit (NPV umbilical hernia ).  HPI this is a pleasant patient who has had a known umbilical hernia it may have been present for the last 2 pregnancies but she did have an episode earlier this month where it was more painful she could not even really walk and therefore  brought her to the emergency room for further evaluation.  The patient had a CT scan of the abdomen and pelvis while she was in the emergency room and this showed a fat-containing umbilical hernia defect with a large hernia sac but the actual defect in the muscle is 1.5 cm the patient states that she has had a previous surgery she had a laparoscopic appendectomy many years ago.  No other operations.    She is otherwise reasonably healthy.  BMI is 46.13.    Social history she does not smoke or drink alcohol.    Family history is noncontributory    Review of Systems 10 point review is otherwise negative    Objective   Physical Exam on physical exam the patient is overweight head is normocephalic atraumatic eyes extraocular movements are intact she does wear glasses lungs are clear bilaterally heart is regular rate and rhythm examination of the abdomen reveals that she has a somewhat stretched out umbilicus but there is no palpable hernia at this time.  That could be limited by her body habitus.  She does have a pannus.  The extremities do not reveal any gross deformities.    Assessment/Plan I had a long conversation with the patient and her  who is present about the anatomy what a hernia is and what her options for repair are.  If she continues to have symptoms and trips to the emergency room we may have to fix it sooner rather than later.  In an ideal situation she would be a good candidate for robotic repair given the depth of her subcutaneous tissue.  If it were an emergency and incarcerated that may or may not be possible.  Even more ideally  the patient would have surgery when her BMI is closer to 35.  I discussed with the patient that her odds of a recurrence are less if her BMI is in a more normal range.  She was actually wanting to lose weight anyhow and will take steps towards this and come back when her weight is more in the 225 range which would put her in a BMI of around 40.           Cordelia Morel MD 02/10/25 4:40 PM

## 2025-02-11 NOTE — PROGRESS NOTES
Subjective   Monse White is a 28 y.o.  at 36w2d, presents for a routine prenatal visit.   Feels well.   Not checking BP's at home. Normal range BP in office.  Has growth ultrasound tomorrow. No NST today because of ultrasound tomorrow.    Objective     /72   Wt 129 kg (284 lb)   LMP 2023 (Exact Date)   BMI 48.75 kg/m²     Lab Results   Component Value Date    HGB 11.9 (L) 2024    HCT 36.0 2024     Informal ultrasound incidental BPP , BREECH, head in LUQ.    Plan  1. Supervision of other normal pregnancy, antepartum ([facility])  - POCT UA (nonautomated) manually resulted  - GBS swab done.  - Breech on ultrasound. Reviewed exercises.  - signed L&D consent.   - Schedule external cephalic version is breech persists next week.    2. Obesity in pregnancy ([facility])  BMI 48. As of now, okay for C delivery.  NST next week.  Growth ultrasound tomorrow.    3. Pre-existing hypertension during pregnancy, antepartum, unspecified pre-existing hypertension type ([facility])  Normal range BP's.   Likely IOL 39 weeks unless BP issues.     Tabatha Cardozo MD  
105

## 2025-05-15 ENCOUNTER — OFFICE VISIT (OUTPATIENT)
Dept: URGENT CARE | Age: 30
End: 2025-05-15
Payer: COMMERCIAL

## 2025-05-15 VITALS
SYSTOLIC BLOOD PRESSURE: 135 MMHG | BODY MASS INDEX: 44.56 KG/M2 | TEMPERATURE: 97.9 F | HEIGHT: 64 IN | OXYGEN SATURATION: 97 % | WEIGHT: 261.02 LBS | DIASTOLIC BLOOD PRESSURE: 61 MMHG | RESPIRATION RATE: 16 BRPM | HEART RATE: 72 BPM

## 2025-05-15 DIAGNOSIS — B09 VIRAL EXANTHEM: Primary | ICD-10-CM

## 2025-05-15 DIAGNOSIS — B08.3 FIFTH DISEASE: Primary | ICD-10-CM

## 2025-05-15 ASSESSMENT — ENCOUNTER SYMPTOMS
CHILLS: 0
SHORTNESS OF BREATH: 0
EYE DISCHARGE: 0
WHEEZING: 0
CHEST TIGHTNESS: 0
VOMITING: 0
ARTHRALGIAS: 1
EYE ITCHING: 0
FATIGUE: 1
SORE THROAT: 0
DIZZINESS: 0
SINUS PRESSURE: 0
FLANK PAIN: 0
FEVER: 0
SEIZURES: 0
STRIDOR: 0
EYE PAIN: 0
DIARRHEA: 0
COUGH: 0
ABDOMINAL PAIN: 0

## 2025-05-15 ASSESSMENT — VISUAL ACUITY: OU: 1

## 2025-05-15 NOTE — PATIENT INSTRUCTIONS
Discharge instructions:    Please follow up with your Primary Care Physician within the next 5-7 days.    Attending physician consulted on patient case.  Attending physician believes rash is secondary to fifths disease.  Recommending at least several weeks of avoiding any immunocompromised people.  If you develop any severe fatigue, severe bleeding, abdominal pain, worsening rash, chest pain, shortness of breath, worsening symptoms immediately go to the emergency room for evaluation.  Otherwise attending physician okay with over-the-counter pain control as needed for joint pain with follow-up to PCP as above.    It is important to take prescriptions as prescribed and complete all antibiotics.     If your symptoms worsen you are instructed to immediately go to the emergency room for reevaluation and further assessment.    If you develop any chest pain, SOB, or difficulty breathing you are instructed to go to the emergency room for reevaluation.    All discharge instructions will be provided and explained to the patient at discharge.    If you have any questions regarding your treatment plan please call the [facility] urgent care clinic.

## 2025-05-15 NOTE — PROGRESS NOTES
Subjective   Patient ID: Monse White is a 29 y.o. female. They present today with a chief complaint of Rash (On left arm since tuesday. Exposed to fifths disease. Joint swelling and pain.).    History of Present Illness  Patient is a 29-year-old female presenting to the clinic with complaints of a rash.  Patient states over the weekend she developed fatigue.  Patient states her daughter did have fifth disease around 3 to 4 weeks ago.  Patient states yesterday she developed a rash left arm erythematous macular no itching no pain.  Patient states she also developed joint pain over her wrists and ankles.  Patient denies any fever, chills, vomiting, abdominal pain, chest pain, shortness of breath, runny nose or cough.      History provided by:  Patient  Rash  Associated symptoms include fatigue. Pertinent negatives include no congestion, cough, diarrhea, eye pain, fever, shortness of breath, sore throat or vomiting.       Past Medical History  Allergies as of 05/15/2025 - Reviewed 05/15/2025   Allergen Reaction Noted    Penicillins Itching and Rash 04/13/2019       Prescriptions Prior to Admission[1]     Medical History[2]    Surgical History[3]     reports that she has never smoked. She has never used smokeless tobacco. She reports that she does not currently use alcohol. She reports that she does not use drugs.    Review of Systems  Review of Systems   Constitutional:  Positive for fatigue. Negative for chills and fever.   HENT:  Negative for congestion, ear discharge, ear pain, postnasal drip, sinus pressure and sore throat.    Eyes:  Negative for pain, discharge and itching.   Respiratory:  Negative for cough, chest tightness, shortness of breath, wheezing and stridor.    Cardiovascular:  Negative for chest pain.   Gastrointestinal:  Negative for abdominal pain, diarrhea and vomiting.   Genitourinary:  Negative for flank pain.   Musculoskeletal:  Positive for arthralgias.   Skin:  Positive for rash.  "  Neurological:  Negative for dizziness and seizures.                                  Objective    Vitals:    05/15/25 0929   BP: 135/61   Pulse: 72   Resp: 16   Temp: 36.6 °C (97.9 °F)   SpO2: 97%   Weight: 118 kg (261 lb 0.4 oz)   Height: 1.626 m (5' 4\")     Patient's last menstrual period was 05/08/2025 (exact date).    Physical Exam  Vitals reviewed.   Constitutional:       General: She is awake. She is not in acute distress.     Appearance: Normal appearance. She is well-developed. She is not ill-appearing or toxic-appearing.   HENT:      Head: Normocephalic and atraumatic.      Right Ear: Tympanic membrane, ear canal and external ear normal.      Left Ear: Tympanic membrane, ear canal and external ear normal.      Nose: No congestion.      Mouth/Throat:      Mouth: Mucous membranes are moist.      Pharynx: Oropharynx is clear. Uvula midline. No oropharyngeal exudate or posterior oropharyngeal erythema.      Tonsils: No tonsillar exudate or tonsillar abscesses.   Eyes:      General: Vision grossly intact.      Conjunctiva/sclera: Conjunctivae normal.   Cardiovascular:      Rate and Rhythm: Normal rate and regular rhythm.      Heart sounds: Normal heart sounds, S1 normal and S2 normal. No murmur heard.     No friction rub. No gallop.   Pulmonary:      Effort: Pulmonary effort is normal. No respiratory distress.      Breath sounds: Normal breath sounds. No stridor. No wheezing, rhonchi or rales.   Skin:     General: Skin is warm.      Comments: Rash left arm some areas of purple discoloration that are nonblanchable.  There are areas of erythematous macular rash on the left arm is not blanchable at this time.  No pustules.  No vesicles.   Neurological:      General: No focal deficit present.      Mental Status: She is alert and oriented to person, place, and time. Mental status is at baseline.      Gait: Gait is intact.   Psychiatric:         Mood and Affect: Mood normal.         Behavior: Behavior normal. " Behavior is cooperative.         Procedures    Point of Care Test & Imaging Results from this visit  No results found for this visit on 05/15/25.   Imaging  No results found.    Cardiology, Vascular, and Other Imaging  No other imaging results found for the past 2 days      Diagnostic study results (if any) were reviewed by [facility].    Assessment/Plan   Allergies, medications, history, and pertinent labs/EKGs/Imaging reviewed by Sacha Nicole PA-C.     Medical Decision Making:    Patient is a 29-year-old female presenting to the clinic with complaints of a rash.  Patient states over the weekend she developed fatigue.  Patient states her daughter did have fifth disease around 3 to 4 weeks ago.  Patient states yesterday she developed a rash left arm erythematous macular no itching no pain.  Patient states she also developed joint pain over her wrists and ankles.  Patient denies any fever, chills, vomiting, abdominal pain, chest pain, shortness of breath, runny nose or cough.  Vital signs in the clinic are stable.  Physical examination as above.  Attending physician consulted in patient case.  Attending physician agrees symptoms are likely secondary to fist disease.  Recommending over-the-counter pain control for joint pain with follow-up to PCP recommendations and discharge instructions to follow. Discharge instructions: Please follow up with your Primary Care Physician within the next 5-7 days. Attending physician consulted on patient case.  Attending physician believes rash is secondary to fifths disease.  Recommending at least several weeks of avoiding any immunocompromised people.  If you develop any severe fatigue, severe bleeding, abdominal pain, worsening rash, chest pain, shortness of breath, worsening symptoms immediately go to the emergency room for evaluation.  Otherwise attending physician okay with over-the-counter pain control as needed for joint pain with follow-up to PCP as above. It is  important to take prescriptions as prescribed and complete all antibiotics. If your symptoms worsen you are instructed to immediately go to the emergency room for reevaluation and further assessment. If you develop any chest pain, SOB, or difficulty breathing you are instructed to go to the emergency room for reevaluation. All discharge instructions will be provided and explained to the patient at discharge. If you have any questions regarding your treatment plan please call the [facility] urgent care clinic.     Orders and Diagnoses  There are no diagnoses linked to this encounter.    Medical Admin Record      Patient disposition: Home    Electronically signed by [facility]  9:49 AM           [1] (Not in a hospital admission)  [2] No past medical history on file.  [3]   Past Surgical History:  Procedure Laterality Date    APPENDECTOMY  09/05/2013    Appendectomy

## 2025-05-16 ENCOUNTER — TELEPHONE (OUTPATIENT)
Dept: URGENT CARE | Age: 30
End: 2025-05-16

## 2025-05-16 NOTE — TELEPHONE ENCOUNTER
PT seen on 05/15/2025. Next day courtesy call. LVM with clinic number letting PT know they can call back with any questions or concerns.

## 2025-06-20 ENCOUNTER — OFFICE VISIT (OUTPATIENT)
Dept: URGENT CARE | Age: 30
End: 2025-06-20
Payer: COMMERCIAL

## 2025-06-20 VITALS
OXYGEN SATURATION: 97 % | BODY MASS INDEX: 45.41 KG/M2 | DIASTOLIC BLOOD PRESSURE: 82 MMHG | TEMPERATURE: 98.7 F | HEART RATE: 74 BPM | SYSTOLIC BLOOD PRESSURE: 125 MMHG | RESPIRATION RATE: 18 BRPM | WEIGHT: 264.55 LBS

## 2025-06-20 DIAGNOSIS — K04.7 DENTAL INFECTION: Primary | ICD-10-CM

## 2025-06-20 PROCEDURE — 99213 OFFICE O/P EST LOW 20 MIN: CPT

## 2025-06-20 RX ORDER — CEFDINIR 300 MG/1
300 CAPSULE ORAL 2 TIMES DAILY
Qty: 14 CAPSULE | Refills: 0 | Status: SHIPPED | OUTPATIENT
Start: 2025-06-20 | End: 2025-06-27

## 2025-06-20 RX ORDER — CEPHALEXIN 500 MG/1
500 CAPSULE ORAL 2 TIMES DAILY
Qty: 14 CAPSULE | Refills: 0 | Status: SHIPPED | OUTPATIENT
Start: 2025-06-20 | End: 2025-06-20

## 2025-06-20 ASSESSMENT — ENCOUNTER SYMPTOMS
WHEEZING: 0
DEPRESSION: 0
DIARRHEA: 0
EYE DISCHARGE: 0
STRIDOR: 0
OCCASIONAL FEELINGS OF UNSTEADINESS: 0
CHEST TIGHTNESS: 0
CHILLS: 0
SORE THROAT: 0
COUGH: 0
DIZZINESS: 0
FATIGUE: 0
EYE ITCHING: 0
SINUS PRESSURE: 0
VOMITING: 0
SHORTNESS OF BREATH: 0
ABDOMINAL PAIN: 0
FEVER: 0
EYE PAIN: 0
LOSS OF SENSATION IN FEET: 0

## 2025-06-20 NOTE — PATIENT INSTRUCTIONS
Discharge instructions:    Please follow up with your dentist within the next 5-7 days.    According to literature no known risk with cefdinir during breast feeding will use as lower risk with 3rd generation cephalosporins with penicillin cross reactivity    Patient denies any anaphylactic concerns with penicillins.  No hives.  Patient states she developed mild rash.  Given concern for strep induced dental infection I would like to cover with cephalosporins  Patient understands risk with ATB.  If mom is concerned I would recommend supplementing bottlefeeding.    If you develop any fever chills sweats visible abscesses chest pain shortness of breath difficulty breathing or vomiting or worsening symptoms to go to the emergency room for evaluation.    It is important to take prescriptions as prescribed and complete all antibiotics.     If your symptoms worsen you are instructed to immediately go to the emergency room for reevaluation and further assessment.    If you develop any chest pain, SOB, or difficulty breathing you are instructed to go to the emergency room for reevaluation.    All discharge instructions will be provided and explained to the patient at discharge.    If you have any questions regarding your treatment plan please call the [facility] urgent care clinic.

## 2025-06-20 NOTE — PROGRESS NOTES
Subjective   Patient ID: Monse White is a 29 y.o. female. They present today with a chief complaint of Other (Tooth pain, swelling since this am).    History of Present Illness  Patient is a 29-year-old female presenting to the clinic with complaints of tooth pain.  Patient states she has had tooth pain left upper second to rear molar left side.  Tooth pain for the last several days.  Patient states tooth pain is exacerbated with food and chewing.  No fever no chills no visible abscesses no other acute ROS at this time.      History provided by:  Patient      Past Medical History  Allergies as of 06/20/2025 - Reviewed 06/20/2025   Allergen Reaction Noted    Penicillins Itching and Rash 04/13/2019       Prescriptions Prior to Admission[1]     Medical History[2]    Surgical History[3]     reports that she has never smoked. She has never used smokeless tobacco. She reports that she does not currently use alcohol. She reports that she does not use drugs.    Review of Systems  Review of Systems   Constitutional:  Negative for chills, fatigue and fever.   HENT:  Positive for dental problem. Negative for congestion, ear discharge, ear pain, postnasal drip, sinus pressure and sore throat.    Eyes:  Negative for pain, discharge and itching.   Respiratory:  Negative for cough, chest tightness, shortness of breath, wheezing and stridor.    Cardiovascular:  Negative for chest pain.   Gastrointestinal:  Negative for abdominal pain, diarrhea and vomiting.   Neurological:  Negative for dizziness.   All other systems reviewed and are negative.                                 Objective    Vitals:    06/20/25 1932   BP: 125/82   Pulse: 74   Resp: 18   Temp: 37.1 °C (98.7 °F)   TempSrc: Oral   SpO2: 97%   Weight: 120 kg (264 lb 8.8 oz)     No LMP recorded.    Physical Exam  Vitals reviewed.   Constitutional:       General: She is awake. She is not in acute distress.     Appearance: Normal appearance. She is well-developed. She  is not ill-appearing or toxic-appearing.   HENT:      Head: Normocephalic and atraumatic.      Right Ear: Tympanic membrane, ear canal and external ear normal.      Left Ear: Tympanic membrane, ear canal and external ear normal.      Mouth/Throat:      Mouth: Mucous membranes are moist.      Pharynx: Oropharynx is clear. Uvula midline. No oropharyngeal exudate or posterior oropharyngeal erythema.      Tonsils: No tonsillar exudate or tonsillar abscesses.      Comments: No oropharyngeal abnoamlities. Left upper sexond to last molar with dental caries, dental decay throughout. No visible abscesses.  Eyes:      Conjunctiva/sclera: Conjunctivae normal.   Cardiovascular:      Rate and Rhythm: Normal rate and regular rhythm.      Heart sounds: Normal heart sounds, S1 normal and S2 normal. No murmur heard.     No friction rub. No gallop.   Pulmonary:      Effort: Pulmonary effort is normal. No respiratory distress.      Breath sounds: Normal breath sounds. No stridor. No wheezing, rhonchi or rales.   Skin:     General: Skin is warm.   Neurological:      General: No focal deficit present.      Mental Status: She is alert and oriented to person, place, and time. Mental status is at baseline.      Gait: Gait is intact.   Psychiatric:         Mood and Affect: Mood normal.         Behavior: Behavior normal. Behavior is cooperative.         Procedures    Point of Care Test & Imaging Results from this visit  No results found for this visit on 06/20/25.   Imaging  No results found.    Cardiology, Vascular, and Other Imaging  No other imaging results found for the past 2 days      Diagnostic study results (if any) were reviewed by [facility].    Assessment/Plan   Allergies, medications, history, and pertinent labs/EKGs/Imaging reviewed by Sacha Nicole PA-C.     Medical Decision Making:    Patient is a 29-year-old female presenting to the clinic with complaints of tooth pain.  Patient states she has had tooth pain left  upper second to rear molar left side.  Tooth pain for the last several days.  Patient states tooth pain is exacerbated with food and chewing.  No fever no chills no visible abscesses no other acute ROS at this time. Cefdinir safe during breast feeding not detectable in breast milk, Third generation cephalosporins lowest risk cross reactivity with penciillin allergy. To cover for likely dental infection. Discharge instructions: Please follow up with your dentist within the next 5-7 days. According to literature no known risk with cefdinir during breast feeding will use as lower risk with 3rd generation cephalosporins with penicillin cross reactivity Patient denies any anaphylactic concerns with penicillins.  No hives.  Patient states she developed mild rash.  Given concern for dental infection I would like to cover with cephalosporins  Patient understands risk with ATB.  If mom is concerned I would recommend supplementing bottlefeeding. If you develop any fever chills sweats visible abscesses chest pain shortness of breath difficulty breathing or vomiting or worsening symptoms to go to the emergency room for evaluation. It is important to take prescriptions as prescribed and complete all antibiotics. If your symptoms worsen you are instructed to immediately go to the emergency room for reevaluation and further assessment. If you develop any chest pain, SOB, or difficulty breathing you are instructed to go to the emergency room for reevaluation. All discharge instructions will be provided and explained to the patient at discharge. If you have any questions regarding your treatment plan please call the [facility] urgent care clinic.     Orders and Diagnoses  There are no diagnoses linked to this encounter.    Medical Admin Record      Patient disposition: Home    Electronically signed by [facility]  7:36 PM           [1] (Not in a hospital admission)  [2] No past medical history on file.  [3]   Past  Surgical History:  Procedure Laterality Date    APPENDECTOMY  09/05/2013    Appendectomy